# Patient Record
Sex: FEMALE | Race: WHITE | NOT HISPANIC OR LATINO | URBAN - METROPOLITAN AREA
[De-identification: names, ages, dates, MRNs, and addresses within clinical notes are randomized per-mention and may not be internally consistent; named-entity substitution may affect disease eponyms.]

---

## 2020-10-28 ENCOUNTER — OFFICE (OUTPATIENT)
Dept: URBAN - METROPOLITAN AREA CLINIC 75 | Facility: CLINIC | Age: 30
End: 2020-10-28

## 2020-10-28 VITALS — HEIGHT: 62 IN | WEIGHT: 193 LBS

## 2020-10-28 DIAGNOSIS — J90 PLEURAL EFFUSION, NOT ELSEWHERE CLASSIFIED: ICD-10-CM

## 2020-10-28 DIAGNOSIS — R10.11 RIGHT UPPER QUADRANT PAIN: ICD-10-CM

## 2020-10-28 DIAGNOSIS — K29.70 GASTRITIS, UNSPECIFIED, WITHOUT BLEEDING: ICD-10-CM

## 2020-10-28 DIAGNOSIS — K65.1 PERITONEAL ABSCESS: ICD-10-CM

## 2020-10-28 PROCEDURE — 99204 OFFICE O/P NEW MOD 45 MIN: CPT | Performed by: INTERNAL MEDICINE

## 2020-12-18 ENCOUNTER — OFFICE (OUTPATIENT)
Dept: URBAN - METROPOLITAN AREA CLINIC 75 | Facility: CLINIC | Age: 30
End: 2020-12-18

## 2020-12-18 VITALS
HEART RATE: 74 BPM | RESPIRATION RATE: 15 BRPM | TEMPERATURE: 97.3 F | WEIGHT: 193 LBS | HEIGHT: 62 IN | OXYGEN SATURATION: 99 %

## 2020-12-18 DIAGNOSIS — R10.11 RIGHT UPPER QUADRANT PAIN: ICD-10-CM

## 2020-12-18 DIAGNOSIS — K58.0 IRRITABLE BOWEL SYNDROME WITH DIARRHEA: ICD-10-CM

## 2020-12-18 DIAGNOSIS — K65.1 PERITONEAL ABSCESS: ICD-10-CM

## 2020-12-18 PROCEDURE — 99214 OFFICE O/P EST MOD 30 MIN: CPT | Performed by: INTERNAL MEDICINE

## 2021-03-18 ENCOUNTER — OFFICE (OUTPATIENT)
Dept: URBAN - METROPOLITAN AREA CLINIC 75 | Facility: CLINIC | Age: 31
End: 2021-03-18

## 2021-03-18 VITALS
DIASTOLIC BLOOD PRESSURE: 68 MMHG | SYSTOLIC BLOOD PRESSURE: 112 MMHG | TEMPERATURE: 96.3 F | HEIGHT: 62 IN | OXYGEN SATURATION: 99 % | WEIGHT: 187 LBS | HEART RATE: 87 BPM

## 2021-03-18 DIAGNOSIS — K29.70 GASTRITIS, UNSPECIFIED, WITHOUT BLEEDING: ICD-10-CM

## 2021-03-18 DIAGNOSIS — R10.11 RIGHT UPPER QUADRANT PAIN: ICD-10-CM

## 2021-03-18 DIAGNOSIS — K58.0 IRRITABLE BOWEL SYNDROME WITH DIARRHEA: ICD-10-CM

## 2021-03-18 DIAGNOSIS — K65.1 PERITONEAL ABSCESS: ICD-10-CM

## 2021-03-18 PROCEDURE — 99214 OFFICE O/P EST MOD 30 MIN: CPT | Performed by: NURSE PRACTITIONER

## 2021-09-02 ENCOUNTER — OFFICE VISIT (OUTPATIENT)
Dept: OBSTETRICS AND GYNECOLOGY | Age: 31
End: 2021-09-02

## 2021-09-02 VITALS
BODY MASS INDEX: 33.49 KG/M2 | HEIGHT: 63 IN | SYSTOLIC BLOOD PRESSURE: 128 MMHG | WEIGHT: 189 LBS | DIASTOLIC BLOOD PRESSURE: 74 MMHG

## 2021-09-02 DIAGNOSIS — Z34.90 EARLY STAGE OF PREGNANCY: Primary | ICD-10-CM

## 2021-09-02 DIAGNOSIS — Z98.891 H/O: C-SECTION: ICD-10-CM

## 2021-09-02 PROCEDURE — 99203 OFFICE O/P NEW LOW 30 MIN: CPT | Performed by: PHYSICIAN ASSISTANT

## 2021-09-02 RX ORDER — PRENATAL VIT NO.126/IRON/FOLIC 28MG-0.8MG
TABLET ORAL DAILY
COMMUNITY
End: 2022-08-29

## 2021-09-02 NOTE — PROGRESS NOTES
"Subjective     Chief Complaint   Patient presents with   • Gynecologic Exam     new pt pregnancy confirmation       Tracie Aguilar is a 30 y.o.  whose LMP is Patient's last menstrual period was 2021 (exact date). presents for early stage of pregnancy    She is a former pt here  Saw Dr Fisher  Was on her Mom's insurance and had to switch to [employer]'s    She was seen across the street at [facility]    She delivered her daughter with them  Had a csection and infections after that   Emergency csection, failed IOL and FHR dropped  Called to say she had fever after baby was born and appt 3 wks later revealed infection  Admitted to hospital for tx, was in for 4 days  Pt had drains in place-had B abscesses in \"gutters\"  Told she had group b strep but tested negative in pregnancy  Opted to come here for her second pregnancy  I don't see records from [facility] in care everywhere, I've requested from [facility]    Here with  Aurelio    Is doing ok  Has nausea and bowels are not regular  She has been diagnosed with ibs  Is dealing with constipation and diarrhea  Have used ib guard also, wants to r/s that    No other med hx  No Additional Complaints Reported    The following portions of the patient's history were reviewed and updated as appropriate:vital signs, allergies, current medications, past family history, past medical history, past social history, past surgical history and problem list      Review of Systems   Genitourinary:positive for early stage of pregnancy     Objective      /74   Ht 160 cm (63\")   Wt 85.7 kg (189 lb)   LMP 2021 (Exact Date)   Breastfeeding No   BMI 33.48 kg/m²     Physical Exam    General:   alert, comfortable and no distress   Heart: Not performed today   Lungs: Not performed today.   Breast: Not performed today   Neck: na   Abdomen: {Not performed today   CVA: Not performed today   Pelvis: Not performed today   Extremities: Not performed today "   Neurologic: negative   Psychiatric: Normal affect, judgement, and mood       Lab Review   Labs: No data reviewed     Imaging   Ultrasound - Pelvic Vaginal    Assessment/Plan     ASSESSMENT  1. Early stage of pregnancy        PLAN  1.   Orders Placed This Encounter   Procedures   • Urine Culture - Urine, Urine, Random Void   • HIV-1 / O / 2 Ag / Antibody 4th Generation   • Hepatitis B Surface Antigen   • Hepatitis C Antibody   • Hemoglobin A1c   • Rubella Antibody, IgG   • RPR, Rfx Qn RPR / Confirm TP   • ABO / Rh   • Antibody Screen   • CBC & Differential       2. Will obtain MR from [facility]. Pt also wants to discuss , gave HO and advised pt that Dr Fisher would review records and discuss  with pt. Pt is open to another csection as well, would just like to know what her options are    3. PNL today. Unsure if carrier screening was done previously. Did not do NIPS testing previously and doesn't think she would do it now. HO given for both options    4. Call for any issues, otherwise, f/u in 2 wks    Follow up: 2 week(s)    PAVEL Flores  2021

## 2021-09-03 ENCOUNTER — PATIENT ROUNDING (BHMG ONLY) (OUTPATIENT)
Dept: OBSTETRICS AND GYNECOLOGY | Age: 31
End: 2021-09-03

## 2021-09-03 NOTE — PROGRESS NOTES
September 3, 2021    Hello, may I speak with Tracie Aguilar?    My name is Irais       I am  with [facility]  [address] [address] [phone].    Before we get started may I verify your date of birth? 1990    I am calling to officially welcome you to our practice and ask about your recent visit. Is this a good time to talk? Yes     Tell me about your visit with us. What things went well? I saw Taniya and everyone was very nice and didn't have to wait long.        We're always looking for ways to make our patients' experiences even better. Do you have recommendations on ways we may improve?  No not yet     Overall were you satisfied with your first visit to our practice? Yes, all staff were very nice.  She is a former patient from a few years ago and is glad to be back. Pt said she loves this practice         I appreciate you taking the time to speak with me today. Is there anything else I can do for you? No    Thank you, and have a great day.

## 2021-09-04 LAB
ABO GROUP BLD: NORMAL
BACTERIA UR CULT: ABNORMAL
BASOPHILS # BLD AUTO: 0 X10E3/UL (ref 0–0.2)
BASOPHILS NFR BLD AUTO: 1 %
BLD GP AB SCN SERPL QL: NEGATIVE
EOSINOPHIL # BLD AUTO: 0.1 X10E3/UL (ref 0–0.4)
EOSINOPHIL NFR BLD AUTO: 1 %
ERYTHROCYTE [DISTWIDTH] IN BLOOD BY AUTOMATED COUNT: 12.9 % (ref 11.7–15.4)
HBA1C MFR BLD: 5.4 % (ref 4.8–5.6)
HBV SURFACE AG SERPL QL IA: NEGATIVE
HCT VFR BLD AUTO: 38 % (ref 34–46.6)
HCV AB S/CO SERPL IA: <0.1 S/CO RATIO (ref 0–0.9)
HGB BLD-MCNC: 12.6 G/DL (ref 11.1–15.9)
HIV 1+2 AB+HIV1 P24 AG SERPL QL IA: NON REACTIVE
IMM GRANULOCYTES # BLD AUTO: 0 X10E3/UL (ref 0–0.1)
IMM GRANULOCYTES NFR BLD AUTO: 1 %
LYMPHOCYTES # BLD AUTO: 1.9 X10E3/UL (ref 0.7–3.1)
LYMPHOCYTES NFR BLD AUTO: 23 %
MCH RBC QN AUTO: 29.4 PG (ref 26.6–33)
MCHC RBC AUTO-ENTMCNC: 33.2 G/DL (ref 31.5–35.7)
MCV RBC AUTO: 89 FL (ref 79–97)
MONOCYTES # BLD AUTO: 0.6 X10E3/UL (ref 0.1–0.9)
MONOCYTES NFR BLD AUTO: 7 %
NEUTROPHILS # BLD AUTO: 5.6 X10E3/UL (ref 1.4–7)
NEUTROPHILS NFR BLD AUTO: 67 %
PLATELET # BLD AUTO: 264 X10E3/UL (ref 150–450)
RBC # BLD AUTO: 4.29 X10E6/UL (ref 3.77–5.28)
RH BLD: POSITIVE
RPR SER QL: NON REACTIVE
RUBV IGG SERPL IA-ACNC: 5.19 INDEX
WBC # BLD AUTO: 8.3 X10E3/UL (ref 3.4–10.8)

## 2021-09-07 ENCOUNTER — TELEPHONE (OUTPATIENT)
Dept: OBSTETRICS AND GYNECOLOGY | Age: 31
End: 2021-09-07

## 2021-09-07 RX ORDER — NITROFURANTOIN 25; 75 MG/1; MG/1
100 CAPSULE ORAL 2 TIMES DAILY
Qty: 14 CAPSULE | Refills: 0 | Status: SHIPPED | OUTPATIENT
Start: 2021-09-07 | End: 2021-09-20

## 2021-09-07 NOTE — TELEPHONE ENCOUNTER
----- Message from PAVEL Riley sent at 9/7/2021  1:57 PM EDT -----  Let her know her prenatal labs are wnl but her urine culture is showing evidence of infection. I am sending in macrobid to treat her. Looks like she is allergic to pcn and sulfa

## 2021-09-08 ENCOUNTER — TELEPHONE (OUTPATIENT)
Dept: OBSTETRICS AND GYNECOLOGY | Age: 31
End: 2021-09-08

## 2021-09-08 NOTE — TELEPHONE ENCOUNTER
macrobid is safe during pregnancy but I will send to cindi she will  not be in  the office till later.     atrial flutter

## 2021-09-09 RX ORDER — CEPHALEXIN 500 MG/1
500 CAPSULE ORAL 3 TIMES DAILY
Qty: 21 CAPSULE | Refills: 0 | Status: SHIPPED | OUTPATIENT
Start: 2021-09-09 | End: 2021-09-16

## 2021-09-09 NOTE — TELEPHONE ENCOUNTER
Actually, macrobid is safe in pregnancy, but I have sent in keflex instead for the pt. It shows she is allergic to augmentin, ask her if she has ever taken keflex and if she had any issues. There is a 10% possibility of reaction in pt's that have PCN allergies.

## 2021-09-20 ENCOUNTER — INITIAL PRENATAL (OUTPATIENT)
Dept: OBSTETRICS AND GYNECOLOGY | Age: 31
End: 2021-09-20

## 2021-09-20 VITALS — DIASTOLIC BLOOD PRESSURE: 64 MMHG | SYSTOLIC BLOOD PRESSURE: 108 MMHG | BODY MASS INDEX: 33.13 KG/M2 | WEIGHT: 187 LBS

## 2021-09-20 DIAGNOSIS — Z13.89 SCREENING FOR BLOOD OR PROTEIN IN URINE: Primary | ICD-10-CM

## 2021-09-20 DIAGNOSIS — F41.9 ANXIETY: ICD-10-CM

## 2021-09-20 DIAGNOSIS — O99.820 GBS (GROUP B STREPTOCOCCUS CARRIER), +RV CULTURE, CURRENTLY PREGNANT: ICD-10-CM

## 2021-09-20 DIAGNOSIS — Z98.891 PREVIOUS CESAREAN SECTION: ICD-10-CM

## 2021-09-20 LAB
GLUCOSE UR STRIP-MCNC: NEGATIVE MG/DL
PROT UR STRIP-MCNC: NEGATIVE MG/DL
VZV IGG SER QL: NORMAL

## 2021-09-20 PROCEDURE — 0501F PRENATAL FLOW SHEET: CPT | Performed by: OBSTETRICS & GYNECOLOGY

## 2021-09-20 NOTE — PROGRESS NOTES
The patient is a 30-year-old  2 para 1-0-0-1 at 12 weeks.  I have seen the patient in the past but with her last pregnancy she delivered over at [facility] due to insurance changes.  Patient had a difficult delivery.  She was induced at 41 weeks and had prolonged cervical ripening with Cervidil and then a balloon catheter.  She was started on Pitocin and had spontaneous rupture of membranes.  She did not progress past 3 cm and had a  for nonreassuring fetal heart rate tracing and meconium.  After the patient had been discharged she began to have a fever that was not initially diagnosed but later found to be an intra-abdominal bilateral abscess.  She had a laparotomy and abscess removal.  Patient wanted to discuss route of next delivery.  Patient saw the nurse practitioner for her first visit.  Her urine culture did show group B strep.  She reports that with her abscess it was positive for group B strep.  Patient is taking prenatal vitamins.    Full history is reviewed    OB labs are reviewed.    Exam-see exam tab    Assessment-12 weeks  Prior  complicated by intra-abdominal infection with bilateral abscesses that required reoperation.  Patient also had a prolonged course and did not progress during induction of labor at 41 weeks.  We discussed options of repeat  versus .  We discussed risk and benefits of both.  Patient desires repeat  with no tubal ligation.  New OB information was reviewed.  Patient declines amniocentesis but would like to do cell free DNA.  Patient desires referral to psychiatry for anxiety.  Patient has GI side effects with Augmentin so she would be a candidate for cephalosporin with .

## 2021-10-18 ENCOUNTER — ROUTINE PRENATAL (OUTPATIENT)
Dept: OBSTETRICS AND GYNECOLOGY | Age: 31
End: 2021-10-18

## 2021-10-18 VITALS — WEIGHT: 190 LBS | SYSTOLIC BLOOD PRESSURE: 118 MMHG | DIASTOLIC BLOOD PRESSURE: 74 MMHG | BODY MASS INDEX: 33.66 KG/M2

## 2021-10-18 DIAGNOSIS — Z13.89 SCREENING FOR BLOOD OR PROTEIN IN URINE: Primary | ICD-10-CM

## 2021-10-18 DIAGNOSIS — G43.809 OTHER MIGRAINE WITHOUT STATUS MIGRAINOSUS, NOT INTRACTABLE: ICD-10-CM

## 2021-10-18 DIAGNOSIS — Z98.891 PREVIOUS CESAREAN SECTION: ICD-10-CM

## 2021-10-18 LAB
GLUCOSE UR STRIP-MCNC: NEGATIVE MG/DL
PROT UR STRIP-MCNC: NEGATIVE MG/DL

## 2021-10-18 PROCEDURE — 0502F SUBSEQUENT PRENATAL CARE: CPT | Performed by: OBSTETRICS & GYNECOLOGY

## 2021-10-18 RX ORDER — PROMETHAZINE HYDROCHLORIDE 25 MG/1
25 TABLET ORAL EVERY 6 HOURS PRN
Qty: 30 TABLET | Refills: 0 | Status: SHIPPED | OUTPATIENT
Start: 2021-10-18 | End: 2022-01-14

## 2021-10-18 NOTE — PROGRESS NOTES
Patient reports she is having some headaches that feel like migraines.  She took 1 Tylenol but it did not help much.  She did first trimester testing and found that he was a girl.  She has had a few sharp abdominal pains but no vaginal bleeding.  She is wondering if it might be scar tissue from her previous surgery and infection.    Urine culture showed GBS-patient took antibiotics  Cell free DNA was normal.  Baby is a girl  Carrier testing was normal  New OB labs were normal.  Doppler tones are positive  Blood pressure 118/74 with no protein.    Assessment-16 weeks  We discussed migraine headaches-patient will try Tylenol and Phenergan for significant headaches.  She could also try supplementation with magnesium or CoQ10.  Prior  section-patient desires repeat  section with no tubal ligation.  Plan for Ancef at time of   GBS positive  Anxiety-patient has the phone number for the psychiatrist but decided not to call yet because she is feeling better  AFP test today

## 2021-10-20 LAB
AFP ADJ MOM SERPL: 0.75
AFP INTERP SERPL-IMP: NORMAL
AFP INTERP SERPL-IMP: NORMAL
AFP SERPL-MCNC: 21.8 NG/ML
AGE AT DELIVERY: 32.4 YR
GA METHOD: NORMAL
GA: 16 WEEKS
IDDM PATIENT QL: NO
LABORATORY COMMENT REPORT: NORMAL
MULTIPLE PREGNANCY: NO
NEURAL TUBE DEFECT RISK FETUS: NORMAL %
RESULT: NORMAL

## 2021-11-16 ENCOUNTER — ROUTINE PRENATAL (OUTPATIENT)
Dept: OBSTETRICS AND GYNECOLOGY | Age: 31
End: 2021-11-16

## 2021-11-16 VITALS — DIASTOLIC BLOOD PRESSURE: 74 MMHG | SYSTOLIC BLOOD PRESSURE: 120 MMHG | BODY MASS INDEX: 34.19 KG/M2 | WEIGHT: 193 LBS

## 2021-11-16 DIAGNOSIS — Z98.891 PREVIOUS CESAREAN SECTION: ICD-10-CM

## 2021-11-16 DIAGNOSIS — M54.9 BACK PAIN AFFECTING PREGNANCY IN SECOND TRIMESTER: ICD-10-CM

## 2021-11-16 DIAGNOSIS — O99.891 BACK PAIN AFFECTING PREGNANCY IN SECOND TRIMESTER: ICD-10-CM

## 2021-11-16 DIAGNOSIS — Z13.89 SCREENING FOR BLOOD OR PROTEIN IN URINE: Primary | ICD-10-CM

## 2021-11-16 DIAGNOSIS — O99.820 GBS (GROUP B STREPTOCOCCUS CARRIER), +RV CULTURE, CURRENTLY PREGNANT: ICD-10-CM

## 2021-11-16 DIAGNOSIS — F41.9 ANXIETY: ICD-10-CM

## 2021-11-16 PROBLEM — O44.02 PLACENTA PREVIA IN SECOND TRIMESTER: Status: ACTIVE | Noted: 2021-11-16

## 2021-11-16 LAB
GLUCOSE UR STRIP-MCNC: NEGATIVE MG/DL
PROT UR STRIP-MCNC: NEGATIVE MG/DL

## 2021-11-16 PROCEDURE — 0502F SUBSEQUENT PRENATAL CARE: CPT | Performed by: OBSTETRICS & GYNECOLOGY

## 2021-11-16 PROCEDURE — 90686 IIV4 VACC NO PRSV 0.5 ML IM: CPT | Performed by: OBSTETRICS & GYNECOLOGY

## 2021-11-16 PROCEDURE — 90471 IMMUNIZATION ADMIN: CPT | Performed by: OBSTETRICS & GYNECOLOGY

## 2021-11-16 RX ORDER — CARBOPROST TROMETHAMINE 250 UG/ML
250 INJECTION, SOLUTION INTRAMUSCULAR AS NEEDED
Status: CANCELLED | OUTPATIENT
Start: 2021-11-16

## 2021-11-16 RX ORDER — SODIUM CHLORIDE 0.9 % (FLUSH) 0.9 %
1-10 SYRINGE (ML) INJECTION AS NEEDED
Status: CANCELLED | OUTPATIENT
Start: 2021-11-16

## 2021-11-16 RX ORDER — SODIUM CHLORIDE 0.9 % (FLUSH) 0.9 %
10 SYRINGE (ML) INJECTION EVERY 12 HOURS SCHEDULED
Status: CANCELLED | OUTPATIENT
Start: 2021-11-16

## 2021-11-16 RX ORDER — LIDOCAINE HYDROCHLORIDE 10 MG/ML
5 INJECTION, SOLUTION EPIDURAL; INFILTRATION; INTRACAUDAL; PERINEURAL AS NEEDED
Status: CANCELLED | OUTPATIENT
Start: 2021-11-16

## 2021-11-16 RX ORDER — METHYLERGONOVINE MALEATE 0.2 MG/ML
200 INJECTION INTRAVENOUS AS NEEDED
Status: CANCELLED | OUTPATIENT
Start: 2021-11-16

## 2021-11-16 RX ORDER — MISOPROSTOL 100 UG/1
800 TABLET ORAL AS NEEDED
Status: CANCELLED | OUTPATIENT
Start: 2021-11-16

## 2021-11-16 NOTE — PROGRESS NOTES
Patient is here today for anatomy ultrasound with her .  She has some lower back pain.  She has tried some stretches and plans to get a maternity belt.  Baby is moving well.    Anatomy ultrasound shows normal anatomy within the limits of ultrasound.  Size is equal to dates.  Cervical length is normal at 5 cm.  Placenta does show a posterior previa.  Blood pressure 120/74 with no protein  Weight gain for pregnancy 3 pounds.    Assessment-20 weeks  Back pain-we discussed treatments including stretching, Tylenol, heat, massage.  I will refer the patient to physical therapy.  Placenta previa with posterior placenta-recheck in 4 weeks.  Also relook at the heart views.  Repeat  section with no tubal ligation is planned.  GBS positive

## 2021-12-07 ENCOUNTER — TREATMENT (OUTPATIENT)
Dept: PHYSICAL THERAPY | Facility: CLINIC | Age: 31
End: 2021-12-07

## 2021-12-07 DIAGNOSIS — M54.50 LOW BACK PAIN, UNSPECIFIED BACK PAIN LATERALITY, UNSPECIFIED CHRONICITY, UNSPECIFIED WHETHER SCIATICA PRESENT: Primary | ICD-10-CM

## 2021-12-07 PROCEDURE — 97161 PT EVAL LOW COMPLEX 20 MIN: CPT | Performed by: PHYSICAL THERAPIST

## 2021-12-07 PROCEDURE — 97112 NEUROMUSCULAR REEDUCATION: CPT | Performed by: PHYSICAL THERAPIST

## 2021-12-07 PROCEDURE — 97110 THERAPEUTIC EXERCISES: CPT | Performed by: PHYSICAL THERAPIST

## 2021-12-07 NOTE — PROGRESS NOTES
Physical Therapy Initial Evaluation and Plan of Care    Patient: Tracie Aguilar   : 1990  Diagnosis/ICD-10 Code:  Low back pain, unspecified back pain laterality, unspecified chronicity, unspecified whether sciatica present [M54.50]  Referring practitioner: Chris Fisher MD  Date of Initial Visit: 2021  Today's Date: 2021  Patient seen for 1 session         Visit Diagnoses:    ICD-10-CM ICD-9-CM   1. Low back pain, unspecified back pain laterality, unspecified chronicity, unspecified whether sciatica present  M54.50 724.2         Subjective Questionnaire: Oswestry: score 13      Subjective Evaluation    History of Present Illness  Mechanism of injury: Patient is 23 weeks pregnant and reports that the back has been bothering her for about 5 weeks.  Patient reports caring for her 2 year old also.  Patient reports pain in the right low back extending down the right posterior leg to the knee.      Patient Occupation: [employer]  Current pain rating: 3  At worst pain ratin  Location: right lumbar spine  Quality: dull ache and sharp (shooting)  Alleviating factors: nothing really.  Aggravating factors: ambulation  Progression: worsening             Objective          Postural Observations    Additional Postural Observation Details  Patient ambulates with a slightly guarded trunk.  Normal sit to stand transition.    Palpation     Additional Palpation Details  TTP to the right lumbar pvms.    Active Range of Motion     Lumbar   Flexion: Active lumbar flexion: WNL.   Extension: Active lumbar extension: WNL.   Left lateral flexion: Active left lumbar lateral flexion: about 15. with pain  Right lateral flexion: Active right lumbar lateral flexion: about 15. with pain    Strength/Myotome Testing     Left Hip   Planes of Motion   Abduction: 4+  Adduction: 4+    Right Hip   Planes of Motion   Abduction: 4+  Adduction: 4+    Left Knee   Extension: 4+    Right Knee   Extension: 4+    Left Ankle/Foot    Dorsiflexion: 5    Right Ankle/Foot   Dorsiflexion: 5          Assessment & Plan     Assessment  Impairments: abnormal or restricted ROM, activity intolerance, lacks appropriate home exercise program and pain with function    Assessment details: Patient presents with c/o pain, TTP and limited lumbar SB AROM which is limiting her ability to perform ADL'S.    Barriers to therapy: none  Prognosis: good  Prognosis details: STG's  1)  Independent with HEP  2)  Decrease pain by 50% or more  3)  Min to no TTP present    LTG's   1)  Independent with HEP progression  2)  Decrease pain by 75% or more  3)  Increase AROM for lumbar SB 5-10 degrees  4)  No TTP present  5)  Patient to perform ADL'S with min c/o pain      Plan  Therapy options: will be seen for skilled therapy services  Planned therapy interventions: strengthening, stretching, therapeutic activities, home exercise program and neuromuscular re-education  Frequency: 1x week  Duration in weeks: 4  Treatment plan discussed with: patient            Timed:         Manual Therapy:    0     mins  50766;     Therapeutic Exercise:    16     mins  42819;     Neuromuscular Pilar:    8    mins  30444;    Therapeutic Activity:     0     mins  46365;     Gait Trainin     mins  12114;     Ultrasound:     0     mins  95541;          Un-Timed:  Electrical Stimulation:    0     mins  00861 ( );    Low Eval     10     Mins  68679  Mod Eval     0     Mins  06566  High Eval                       0     Mins  34606        Timed Treatment:   24   mins   Total Treatment:     35   mins          PT: Jhonatan Capps PT     [place] License [id number]  Electronically signed by Jhonatan Capps PT, 21, 7:25 AM EST    Certification Period: 2021 thru 3/6/2022  I certify that the therapy services are furnished while this patient is under my care.  The services outlined above are required by this patient, and will be reviewed every 90 days.         Physician  Signature:__________________________________________________    PHYSICIAN: Chris Fisher MD      DATE:     Please sign and return via fax to [email or web address] . Thank you, [facility].

## 2021-12-10 ENCOUNTER — TELEPHONE (OUTPATIENT)
Dept: OBSTETRICS AND GYNECOLOGY | Age: 31
End: 2021-12-10

## 2021-12-10 NOTE — TELEPHONE ENCOUNTER
I talked to the patient. I would normally bring her to the office but the office is closing early today. She has had some shortness of breath and her blood pressure was high normal for her. Advised the patient to go to labor and delivery triage for evaluation.

## 2021-12-10 NOTE — TELEPHONE ENCOUNTER
----- Message from Tracie Aguilar sent at 12/10/2021  8:25 AM EST -----  Regarding: Feeling off, higher blood pressure   I have not felt well the last two days. Yesterday I was very achy, had rib/upper abdominal and chest pain, and shortness of breath. I also had slight swelling in my hands and wrists with just like weird feeling tingly tiffany of feeling. Slight head ache and dizziness as well off and on.     I had my mother in law who is a nurse take my blood pressure last night. After sitting in the couch resting it was 134/82 with a resting heart rate if 75.  I don’t feel as bad today but still get very tired with even the slightest extra movement. Still tightness in chest and dizziness off and on.

## 2021-12-17 ENCOUNTER — ROUTINE PRENATAL (OUTPATIENT)
Dept: OBSTETRICS AND GYNECOLOGY | Age: 31
End: 2021-12-17

## 2021-12-17 VITALS — BODY MASS INDEX: 34.9 KG/M2 | SYSTOLIC BLOOD PRESSURE: 118 MMHG | DIASTOLIC BLOOD PRESSURE: 74 MMHG | WEIGHT: 197 LBS

## 2021-12-17 DIAGNOSIS — Z13.89 SCREENING FOR BLOOD OR PROTEIN IN URINE: Primary | ICD-10-CM

## 2021-12-17 DIAGNOSIS — O99.820 GBS (GROUP B STREPTOCOCCUS CARRIER), +RV CULTURE, CURRENTLY PREGNANT: ICD-10-CM

## 2021-12-17 DIAGNOSIS — R53.83 OTHER FATIGUE: ICD-10-CM

## 2021-12-17 DIAGNOSIS — M54.9 BACK PAIN AFFECTING PREGNANCY, ANTEPARTUM: ICD-10-CM

## 2021-12-17 DIAGNOSIS — O44.02 PLACENTA PREVIA IN SECOND TRIMESTER: ICD-10-CM

## 2021-12-17 DIAGNOSIS — F41.9 ANXIETY: ICD-10-CM

## 2021-12-17 DIAGNOSIS — O99.891 BACK PAIN AFFECTING PREGNANCY, ANTEPARTUM: ICD-10-CM

## 2021-12-17 PROBLEM — J45.20 MILD INTERMITTENT ASTHMA: Status: ACTIVE | Noted: 2021-12-17

## 2021-12-17 LAB
GLUCOSE UR STRIP-MCNC: NEGATIVE MG/DL
PROT UR STRIP-MCNC: NEGATIVE MG/DL

## 2021-12-17 PROCEDURE — 0502F SUBSEQUENT PRENATAL CARE: CPT | Performed by: OBSTETRICS & GYNECOLOGY

## 2021-12-17 RX ORDER — ALBUTEROL SULFATE 90 UG/1
2 AEROSOL, METERED RESPIRATORY (INHALATION) EVERY 4 HOURS PRN
COMMUNITY

## 2021-12-17 NOTE — PROGRESS NOTES
The patient had been feeling more fatigued.  She is also felt some chest tightness and has a history of asthma.  She is used her albuterol inhaler some but she is not like how it makes her heart race.  She has been trying to rest more and hydrate which has helped.  She does have a history of allergies but is not on any medication for that.  She is feeling fetal movement.  She is here today for ultrasound.  Continue physical therapy for back pain.    Ultrasound shows resolution of placenta previa.  Posterior placenta is 5 cm from the cervix.  Cardiac views appear normal today.  Blood pressure 118/74 no protein.  Weight gain for pregnancy is normal  Lungs-no wheezes heard.  Patient is moving air symmetrically and well.    Assessment-24 weeks  Previa resolved.  Asthma-patient will call her primary care doctor to get in hopefully to be able to do peak flow meters.  We will start her on a Pulmicort inhaler.  Patient could also take allergy medicine.  She has completed her Covid vaccination series last week  Fatigue-check CBC today  Previous  section with history of postop abscess.  Repeat  is scheduled for .  Plan Ancef at time of  due to history of GBS abscess.

## 2021-12-18 LAB
BASOPHILS # BLD AUTO: 0 X10E3/UL (ref 0–0.2)
BASOPHILS NFR BLD AUTO: 0 %
EOSINOPHIL # BLD AUTO: 0.1 X10E3/UL (ref 0–0.4)
EOSINOPHIL NFR BLD AUTO: 1 %
ERYTHROCYTE [DISTWIDTH] IN BLOOD BY AUTOMATED COUNT: 12.2 % (ref 11.7–15.4)
HCT VFR BLD AUTO: 33.9 % (ref 34–46.6)
HGB BLD-MCNC: 11.2 G/DL (ref 11.1–15.9)
IMM GRANULOCYTES # BLD AUTO: 0.1 X10E3/UL (ref 0–0.1)
IMM GRANULOCYTES NFR BLD AUTO: 1 %
LYMPHOCYTES # BLD AUTO: 1.9 X10E3/UL (ref 0.7–3.1)
LYMPHOCYTES NFR BLD AUTO: 18 %
MCH RBC QN AUTO: 29.2 PG (ref 26.6–33)
MCHC RBC AUTO-ENTMCNC: 33 G/DL (ref 31.5–35.7)
MCV RBC AUTO: 89 FL (ref 79–97)
MONOCYTES # BLD AUTO: 0.7 X10E3/UL (ref 0.1–0.9)
MONOCYTES NFR BLD AUTO: 7 %
NEUTROPHILS # BLD AUTO: 7.3 X10E3/UL (ref 1.4–7)
NEUTROPHILS NFR BLD AUTO: 73 %
PLATELET # BLD AUTO: 248 X10E3/UL (ref 150–450)
RBC # BLD AUTO: 3.83 X10E6/UL (ref 3.77–5.28)
WBC # BLD AUTO: 10.2 X10E3/UL (ref 3.4–10.8)

## 2021-12-27 ENCOUNTER — TREATMENT (OUTPATIENT)
Dept: PHYSICAL THERAPY | Facility: CLINIC | Age: 31
End: 2021-12-27

## 2021-12-27 DIAGNOSIS — M54.50 LOW BACK PAIN, UNSPECIFIED BACK PAIN LATERALITY, UNSPECIFIED CHRONICITY, UNSPECIFIED WHETHER SCIATICA PRESENT: Primary | ICD-10-CM

## 2021-12-27 PROCEDURE — 97112 NEUROMUSCULAR REEDUCATION: CPT | Performed by: PHYSICAL THERAPIST

## 2021-12-27 PROCEDURE — 97110 THERAPEUTIC EXERCISES: CPT | Performed by: PHYSICAL THERAPIST

## 2021-12-27 NOTE — PROGRESS NOTES
Physical Therapy Daily Treatment Note      Patient: Tracie Aguilar   : 1990  Referring practitioner: Chris Fisher MD  Date of Initial Visit: Type: THERAPY  Noted: 2021  Today's Date: 2021  Patient seen for 2 sessions       Visit Diagnoses:    ICD-10-CM ICD-9-CM   1. Low back pain, unspecified back pain laterality, unspecified chronicity, unspecified whether sciatica present  M54.50 724.2         Subjective Patient reports that the back felt pretty good last week, but has not been able to do the HEP as much due to the holidays.  Reports that the KT helped.    Objective   See Exercise, Manual, and Modality Logs for complete treatment.       Assessment/Plan  Continued with the KT secondary to the patient noting that it helped.  Patient tolerated the progression of exercises very well, no reports of pain with the routine.  Plan to continue PT one more visit to establish a good HEP that the patient can continue with during the remaninig part of her pregnancy.      Timed:         Manual Therapy:    0     mins  93157;     Therapeutic Exercise:    25     mins  22648;     Neuromuscular Pilar:    8    mins  60323;    Therapeutic Activity:     0     mins  60811;     Gait Trainin     mins  75174;     Ultrasound:     0     mins  46949;    Work Conditioning      __0_  mins  16368      Un-Timed:  Electrical Stimulation:    0     mins  43610 ( );        Timed Treatment:   33   mins   Total Treatment:     33   mins    Jhonatan Capps, PT  [place] License: [id number]

## 2022-01-03 ENCOUNTER — TREATMENT (OUTPATIENT)
Dept: PHYSICAL THERAPY | Facility: CLINIC | Age: 32
End: 2022-01-03

## 2022-01-03 DIAGNOSIS — M54.50 LOW BACK PAIN, UNSPECIFIED BACK PAIN LATERALITY, UNSPECIFIED CHRONICITY, UNSPECIFIED WHETHER SCIATICA PRESENT: Primary | ICD-10-CM

## 2022-01-03 PROCEDURE — PTNOCHG PR CUSTOM PT NO CHARGE VISIT: Performed by: PHYSICAL THERAPIST

## 2022-01-06 ENCOUNTER — TREATMENT (OUTPATIENT)
Dept: PHYSICAL THERAPY | Facility: CLINIC | Age: 32
End: 2022-01-06

## 2022-01-06 DIAGNOSIS — M54.50 LOW BACK PAIN, UNSPECIFIED BACK PAIN LATERALITY, UNSPECIFIED CHRONICITY, UNSPECIFIED WHETHER SCIATICA PRESENT: Primary | ICD-10-CM

## 2022-01-06 PROCEDURE — 97110 THERAPEUTIC EXERCISES: CPT | Performed by: PHYSICAL THERAPIST

## 2022-01-06 PROCEDURE — 97112 NEUROMUSCULAR REEDUCATION: CPT | Performed by: PHYSICAL THERAPIST

## 2022-01-06 NOTE — PROGRESS NOTES
Physical Therapy Daily Treatment Note      Patient: Tracie Aguilar   : 1990  Referring practitioner: Chris Fisher MD  Date of Initial Visit: Type: THERAPY  Noted: 2021  Today's Date: 2022  Patient seen for 3 sessions       Visit Diagnoses:    ICD-10-CM ICD-9-CM   1. Low back pain, unspecified back pain laterality, unspecified chronicity, unspecified whether sciatica present  M54.50 724.2         Subjective Patient reports that the stretches help with the pain.    Objective   See Exercise, Manual, and Modality Logs for complete treatment.       Assessment/Plan Subjective reports continue to be relatively good.  Progressed the strengthening exercises for the hip musculature today.  Patient tolerated the progression of exercises without pain in the lumbar spine.    D/C to HEP at this time.      Timed:         Manual Therapy:    0     mins  10334;     Therapeutic Exercise:    23     mins  95302;     Neuromuscular Pilar:    8    mins  99889;    Therapeutic Activity:     0     mins  41646;     Gait Trainin     mins  42487;     Ultrasound:     0     mins  36556;    Work Conditioning      __0_  mins  05857      Un-Timed:  Electrical Stimulation:    0     mins  48655 ( );        Timed Treatment:   31   mins   Total Treatment:     31   mins    Jhonatan Capps, PT  [place] License: [id number]

## 2022-01-14 ENCOUNTER — ROUTINE PRENATAL (OUTPATIENT)
Dept: OBSTETRICS AND GYNECOLOGY | Age: 32
End: 2022-01-14

## 2022-01-14 VITALS — SYSTOLIC BLOOD PRESSURE: 120 MMHG | BODY MASS INDEX: 35.96 KG/M2 | WEIGHT: 203 LBS | DIASTOLIC BLOOD PRESSURE: 70 MMHG

## 2022-01-14 DIAGNOSIS — Z13.1 SCREENING FOR DIABETES MELLITUS: Primary | ICD-10-CM

## 2022-01-14 DIAGNOSIS — Z98.891 PREVIOUS CESAREAN SECTION: ICD-10-CM

## 2022-01-14 DIAGNOSIS — O99.820 GBS (GROUP B STREPTOCOCCUS CARRIER), +RV CULTURE, CURRENTLY PREGNANT: ICD-10-CM

## 2022-01-14 DIAGNOSIS — Z13.89 SCREENING FOR BLOOD OR PROTEIN IN URINE: ICD-10-CM

## 2022-01-14 LAB
GLUCOSE UR STRIP-MCNC: NEGATIVE MG/DL
PROT UR STRIP-MCNC: NEGATIVE MG/DL

## 2022-01-14 PROCEDURE — 0502F SUBSEQUENT PRENATAL CARE: CPT | Performed by: OBSTETRICS & GYNECOLOGY

## 2022-01-14 PROCEDURE — 90471 IMMUNIZATION ADMIN: CPT | Performed by: OBSTETRICS & GYNECOLOGY

## 2022-01-14 PROCEDURE — 90715 TDAP VACCINE 7 YRS/> IM: CPT | Performed by: OBSTETRICS & GYNECOLOGY

## 2022-01-14 RX ORDER — FERROUS GLUCONATE 324(37.5)
TABLET ORAL
COMMUNITY
End: 2022-04-15

## 2022-01-14 NOTE — PROGRESS NOTES
Patient has been feeling some mild soreness.  Baby is moving.  Very active during the day.  She does have some [place] contractions but only about 1 inhaler.  She did start the steroid inhaler which is helping.    Doppler tones are positive fundal height is appropriate to slightly help.  Blood pressure 120/70 with no protein  Weight gain for pregnancy is normal.    Assessment-28 weeks  Third trimester labs today.  Tdap today  Mild anemia with hemoglobin of 11.2-continue iron  Asthma-continue Pulmicort inhaler and allergy medications.  Patient has had her COVID vaccination  Previous section with history of postop abscess due to GBS.  Repeat  scheduled for .  Plan Ancef at time of .

## 2022-01-15 LAB
BASOPHILS # BLD AUTO: 0 X10E3/UL (ref 0–0.2)
BASOPHILS NFR BLD AUTO: 0 %
EOSINOPHIL # BLD AUTO: 0.1 X10E3/UL (ref 0–0.4)
EOSINOPHIL NFR BLD AUTO: 1 %
ERYTHROCYTE [DISTWIDTH] IN BLOOD BY AUTOMATED COUNT: 12.2 % (ref 11.7–15.4)
GLUCOSE 1H P 50 G GLC PO SERPL-MCNC: 158 MG/DL (ref 65–139)
HCT VFR BLD AUTO: 32.7 % (ref 34–46.6)
HGB BLD-MCNC: 10.8 G/DL (ref 11.1–15.9)
IMM GRANULOCYTES # BLD AUTO: 0.1 X10E3/UL (ref 0–0.1)
IMM GRANULOCYTES NFR BLD AUTO: 1 %
LYMPHOCYTES # BLD AUTO: 1.8 X10E3/UL (ref 0.7–3.1)
LYMPHOCYTES NFR BLD AUTO: 19 %
MCH RBC QN AUTO: 29.4 PG (ref 26.6–33)
MCHC RBC AUTO-ENTMCNC: 33 G/DL (ref 31.5–35.7)
MCV RBC AUTO: 89 FL (ref 79–97)
MONOCYTES # BLD AUTO: 0.6 X10E3/UL (ref 0.1–0.9)
MONOCYTES NFR BLD AUTO: 6 %
NEUTROPHILS # BLD AUTO: 6.8 X10E3/UL (ref 1.4–7)
NEUTROPHILS NFR BLD AUTO: 73 %
PLATELET # BLD AUTO: 210 X10E3/UL (ref 150–450)
RBC # BLD AUTO: 3.67 X10E6/UL (ref 3.77–5.28)
WBC # BLD AUTO: 9.3 X10E3/UL (ref 3.4–10.8)

## 2022-01-24 ENCOUNTER — LAB (OUTPATIENT)
Dept: OBSTETRICS AND GYNECOLOGY | Age: 32
End: 2022-01-24

## 2022-01-24 ENCOUNTER — DOCUMENTATION (OUTPATIENT)
Dept: PHYSICAL THERAPY | Facility: CLINIC | Age: 32
End: 2022-01-24

## 2022-01-24 DIAGNOSIS — Z13.1 SCREENING FOR DIABETES MELLITUS: Primary | ICD-10-CM

## 2022-01-25 LAB
GLUCOSE 1H P 100 G GLC PO SERPL-MCNC: 233 MG/DL (ref 65–179)
GLUCOSE 2H P 100 G GLC PO SERPL-MCNC: 181 MG/DL (ref 65–154)
GLUCOSE 3H P 100 G GLC PO SERPL-MCNC: 168 MG/DL (ref 65–139)
GLUCOSE P FAST SERPL-MCNC: 82 MG/DL (ref 65–94)
Lab: ABNORMAL

## 2022-01-26 ENCOUNTER — TELEPHONE (OUTPATIENT)
Dept: OBSTETRICS AND GYNECOLOGY | Age: 32
End: 2022-01-26

## 2022-01-26 DIAGNOSIS — O24.410 GDM (GESTATIONAL DIABETES MELLITUS), CLASS A1: Primary | ICD-10-CM

## 2022-01-26 NOTE — TELEPHONE ENCOUNTER
----- Message from Chris Fisher MD sent at 1/26/2022  8:41 AM EST -----  Please notify 3 hour is abnormal. Start qid BS check with goal of under 95 fasting and under 120 two hours post meals. Send in glucometer, lancets and test strips. 1 week appointment to review levels. I will order diabetic teaching.

## 2022-01-28 ENCOUNTER — HOSPITAL ENCOUNTER (OUTPATIENT)
Dept: DIABETES SERVICES | Facility: HOSPITAL | Age: 32
Discharge: HOME | End: 2022-01-28
Admitting: OBSTETRICS & GYNECOLOGY

## 2022-01-28 ENCOUNTER — ROUTINE PRENATAL (OUTPATIENT)
Dept: OBSTETRICS AND GYNECOLOGY | Age: 32
End: 2022-01-28

## 2022-01-28 VITALS — DIASTOLIC BLOOD PRESSURE: 74 MMHG | WEIGHT: 205 LBS | SYSTOLIC BLOOD PRESSURE: 110 MMHG | BODY MASS INDEX: 36.31 KG/M2

## 2022-01-28 DIAGNOSIS — O24.410 GDM (GESTATIONAL DIABETES MELLITUS), CLASS A1: ICD-10-CM

## 2022-01-28 DIAGNOSIS — Z98.891 PREVIOUS CESAREAN SECTION: ICD-10-CM

## 2022-01-28 DIAGNOSIS — Z13.89 SCREENING FOR BLOOD OR PROTEIN IN URINE: Primary | ICD-10-CM

## 2022-01-28 LAB
GLUCOSE UR STRIP-MCNC: NEGATIVE MG/DL
PROT UR STRIP-MCNC: NEGATIVE MG/DL

## 2022-01-28 PROCEDURE — G0108 DIAB MANAGE TRN  PER INDIV: HCPCS

## 2022-01-28 PROCEDURE — 0502F SUBSEQUENT PRENATAL CARE: CPT | Performed by: OBSTETRICS & GYNECOLOGY

## 2022-01-28 RX ORDER — LANCETS 33 GAUGE
EACH MISCELLANEOUS
COMMUNITY
Start: 2022-01-26 | End: 2022-04-15

## 2022-01-28 RX ORDER — BLOOD SUGAR DIAGNOSTIC
STRIP MISCELLANEOUS
COMMUNITY
Start: 2022-01-26 | End: 2022-03-27

## 2022-01-28 NOTE — PROGRESS NOTES
The patient was diagnosed with gestational diabetes.  Her 1 hour was elevated 158 and 3 of her values on her 3-hour were significantly high.  Patient is seeing the dietitian today.  She has not started checking her blood sugars yet.  She does admit she has a sweet tooth but is going to try to change her diet.  She is feeling fetal movement.  It feels a little bit different than previous but baby is still active.    Doppler heart tones are positive and fundal height is slightly elevated  Blood pressure 110/74 with no protein  Weight gain for pregnancy is slightly high.    Assessment-30 weeks  Gestational diabetes-we discussed diabetes in detail.  We discussed dietary and exercise changes to make.  She will start checking her blood sugars 4 times a day and bring those in next week.  She will see the diabetic counselor today.  We discussed kick counting.  She is scheduled for an ultrasound in 2 weeks for weight.  Anemia with hemoglobin of 10.8-continue iron  Asthma-continue Pulmicort inhaler and allergy medications.  Previous  with history of postop abscess due to GBS.  Repeat  scheduled for March.  Plan for Ancef at time of .

## 2022-02-04 ENCOUNTER — TELEPHONE (OUTPATIENT)
Dept: OBSTETRICS AND GYNECOLOGY | Age: 32
End: 2022-02-04

## 2022-02-04 NOTE — TELEPHONE ENCOUNTER
The patient cannot come in today due to the bad weather.  She has started checking her blood sugars.  Her fastings have been normal in the 80s.  She has had normal 2-hour postprandials except for 2 elevations 1 of 123 and 1 of 126.  These were after having fruit smoothies.  She is going to avoid those.  She will continue to check and call if she has more elevations.  She will otherwise keep her follow-up appointment.  She has good fetal movement.

## 2022-02-14 ENCOUNTER — ROUTINE PRENATAL (OUTPATIENT)
Dept: OBSTETRICS AND GYNECOLOGY | Age: 32
End: 2022-02-14

## 2022-02-14 VITALS — DIASTOLIC BLOOD PRESSURE: 74 MMHG | SYSTOLIC BLOOD PRESSURE: 108 MMHG | WEIGHT: 204 LBS | BODY MASS INDEX: 36.14 KG/M2

## 2022-02-14 DIAGNOSIS — O99.820 GBS (GROUP B STREPTOCOCCUS CARRIER), +RV CULTURE, CURRENTLY PREGNANT: ICD-10-CM

## 2022-02-14 DIAGNOSIS — Z36.89 ENCOUNTER FOR ULTRASOUND TO ASSESS FETAL GROWTH: ICD-10-CM

## 2022-02-14 DIAGNOSIS — Z13.89 SCREENING FOR BLOOD OR PROTEIN IN URINE: Primary | ICD-10-CM

## 2022-02-14 DIAGNOSIS — Z98.891 PREVIOUS CESAREAN SECTION: ICD-10-CM

## 2022-02-14 DIAGNOSIS — O24.410 GDM (GESTATIONAL DIABETES MELLITUS), CLASS A1: ICD-10-CM

## 2022-02-14 LAB
GLUCOSE UR STRIP-MCNC: NEGATIVE MG/DL
PROT UR STRIP-MCNC: ABNORMAL MG/DL

## 2022-02-14 PROCEDURE — 76816 OB US FOLLOW-UP PER FETUS: CPT | Performed by: OBSTETRICS & GYNECOLOGY

## 2022-02-14 PROCEDURE — 0502F SUBSEQUENT PRENATAL CARE: CPT | Performed by: OBSTETRICS & GYNECOLOGY

## 2022-02-14 NOTE — PROGRESS NOTES
The patient was diagnosed with gestational diabetes.  Her fasting blood sugars have been in the 80s with only one elevated at 96.  Breakfast blood sugars have been normal.  Dinner blood sugars have been normal except for one.  This past week her lunch blood sugars have jumped up to between 121 and 132.  She is following the diet well.  The week before her lunch sugars have been fine.  This weekend her lunch sugars were normal.  She has been having some sharp muscular pains in her abdomen.  No contractions or vaginal bleeding.  Baby is very active.    Ultrasound shows estimated fetal weight of 4 pounds 4 ounces at the 32nd percentile.  Abdominal circumference at the 25th percentile.  Baby is vertex.  ANITA is 20.  Incidental BPP is normal at 8 out of 8.  Blood pressure 108/74 with trace protein  Weight gain of 14 pounds for pregnancy.    Assessment-33 weeks  Gestational diabetes-normal weight today.  Patient's lunch blood sugars have been elevated but have improved the past couple of days.  She will continue to follow.  We discussed if they stay high this week we will add 5 units of regular insulin with lunch.  She will follow up in 1 week.  Recommend kick counts.  She is following the diet well and we discussed diet.  Anemia hemoglobin of 10.8-continue iron  Asthma-continue inhalers  Previous  with history of postop abscess due to GBS.  Repeat  scheduled for .  Plan for Ancef at time of .

## 2022-02-22 ENCOUNTER — TELEPHONE (OUTPATIENT)
Dept: OBSTETRICS AND GYNECOLOGY | Age: 32
End: 2022-02-22

## 2022-02-22 ENCOUNTER — ROUTINE PRENATAL (OUTPATIENT)
Dept: OBSTETRICS AND GYNECOLOGY | Age: 32
End: 2022-02-22

## 2022-02-22 VITALS — WEIGHT: 204 LBS | SYSTOLIC BLOOD PRESSURE: 108 MMHG | DIASTOLIC BLOOD PRESSURE: 70 MMHG | BODY MASS INDEX: 36.14 KG/M2

## 2022-02-22 DIAGNOSIS — Z98.891 PREVIOUS CESAREAN SECTION: ICD-10-CM

## 2022-02-22 DIAGNOSIS — O24.419 GDM, CLASS A2: ICD-10-CM

## 2022-02-22 DIAGNOSIS — Z13.89 SCREENING FOR BLOOD OR PROTEIN IN URINE: Primary | ICD-10-CM

## 2022-02-22 PROBLEM — O24.410 GDM (GESTATIONAL DIABETES MELLITUS), CLASS A1: Status: RESOLVED | Noted: 2022-01-26 | Resolved: 2022-02-22

## 2022-02-22 LAB
GLUCOSE UR STRIP-MCNC: NEGATIVE MG/DL
PROT UR STRIP-MCNC: NEGATIVE MG/DL

## 2022-02-22 PROCEDURE — 0502F SUBSEQUENT PRENATAL CARE: CPT | Performed by: OBSTETRICS & GYNECOLOGY

## 2022-02-22 RX ORDER — IBUPROFEN 200 MG
1 TABLET ORAL 3 TIMES DAILY
Qty: 100 EACH | Refills: 1 | Status: SHIPPED | OUTPATIENT
Start: 2022-02-22 | End: 2022-03-08 | Stop reason: SDUPTHER

## 2022-02-22 NOTE — PROGRESS NOTES
Patient's fasting and 2-hour post breakfast blood sugars have been normal.  Her lunch blood sugars have continued to be elevated about 50% of the time from from 130-137.  2 of her dinner blood sugars have been elevated at 123 and 140.  Patient is following the diet well.  Baby is moving very actively.    Blood pressure 108/70 with no protein  Doppler heart tones are positive.  Fundal height is elevated at 35 cm.  Weight gain for pregnancy is 14 pounds.    Assessment-34 weeks  Gestational diabetes mellitus A2-recommend starting insulin due to elevated lunch and dinner blood sugars.  We will add 5 units with each meal.  We will go up by 2 units if the levels are not under 120.  Continue to check 4 times a day.  Recommend kick counts.  Start BPP's weekly next week and recheck weight in about 3 to 4 weeks.  Previous -patient is scheduled for  with no tubal ligation on   Anemia with hemoglobin of 10.8-continue iron  Asthma-continue inhalers  History of postop abscess due to GBS.  Plan for Ancef at time of .

## 2022-02-22 NOTE — TELEPHONE ENCOUNTER
Patient called stated that the pharmacy called to let her know that they need a PA for the humuLIN. States that they pharmacy is telling her 3/08 -3/18 before they can fill the Rx. Patient stated that the Pharmacy said they would be able to fill it as soon as the PA was completed. Pharmacy was said that they faxed the PA today, then instructed the patient to call us to follow up.

## 2022-03-08 ENCOUNTER — ROUTINE PRENATAL (OUTPATIENT)
Dept: OBSTETRICS AND GYNECOLOGY | Age: 32
End: 2022-03-08

## 2022-03-08 VITALS — WEIGHT: 206 LBS | DIASTOLIC BLOOD PRESSURE: 76 MMHG | BODY MASS INDEX: 36.49 KG/M2 | SYSTOLIC BLOOD PRESSURE: 108 MMHG

## 2022-03-08 DIAGNOSIS — Z98.891 PREVIOUS CESAREAN SECTION: ICD-10-CM

## 2022-03-08 DIAGNOSIS — O24.419 GDM, CLASS A2: ICD-10-CM

## 2022-03-08 DIAGNOSIS — O99.820 GBS (GROUP B STREPTOCOCCUS CARRIER), +RV CULTURE, CURRENTLY PREGNANT: ICD-10-CM

## 2022-03-08 DIAGNOSIS — Z13.89 SCREENING FOR BLOOD OR PROTEIN IN URINE: Primary | ICD-10-CM

## 2022-03-08 LAB
GLUCOSE UR STRIP-MCNC: NEGATIVE MG/DL
PROT UR STRIP-MCNC: NEGATIVE MG/DL

## 2022-03-08 PROCEDURE — 0502F SUBSEQUENT PRENATAL CARE: CPT | Performed by: OBSTETRICS & GYNECOLOGY

## 2022-03-08 RX ORDER — PEN NEEDLE, DIABETIC 29 G X1/2"
NEEDLE, DISPOSABLE MISCELLANEOUS
COMMUNITY
Start: 2022-02-22 | End: 2022-04-15

## 2022-03-08 NOTE — PROGRESS NOTES
Patient reports that her blood sugars are much better.  She did have 1 elevated lunch blood sugar.  She is using 5 units of fast acting insulin with lunch and 5 units with dinner.  Her fastings have been normal.  She notes good active fetal movements.    Biophysical profile is 8 out of 8.  ANITA is high normal at 23  Blood pressure 108/76 with no protein  Cervix is closed and thick  GBS was positive on prior urine culture.    Assessment-36 weeks  Gestational diabetes mellitus A2-continue 5 units of lispro with lunch and dinner.  Continue weekly BPP's and kick counts.  Previous [id number] is scheduled with no tubal ligation on   Anemia-continue iron.  Hemoglobin of 10.8  Asthma-continue inhalers  History of postop abscess due to GBS.  Plan for Ancef at time of .  Patient is very worried about this but we discussed that she had a 30-hour labor prior to her  which greatly increase the risk for endometritis and intra-abdominal infection.  We did discuss repeating Ancef dose 8 hours after  x1.

## 2022-03-09 ENCOUNTER — HOSPITAL ENCOUNTER (OUTPATIENT)
Facility: HOSPITAL | Age: 32
Setting detail: OBSERVATION
Discharge: HOME | End: 2022-03-09
Attending: OBSTETRICS & GYNECOLOGY | Admitting: OBSTETRICS & GYNECOLOGY

## 2022-03-09 ENCOUNTER — ROUTINE PRENATAL (OUTPATIENT)
Dept: OBSTETRICS AND GYNECOLOGY | Age: 32
End: 2022-03-09

## 2022-03-09 ENCOUNTER — TELEPHONE (OUTPATIENT)
Dept: OBSTETRICS AND GYNECOLOGY | Age: 32
End: 2022-03-09

## 2022-03-09 VITALS
OXYGEN SATURATION: 100 % | BODY MASS INDEX: 36.49 KG/M2 | DIASTOLIC BLOOD PRESSURE: 55 MMHG | TEMPERATURE: 98 F | HEART RATE: 88 BPM | HEIGHT: 63 IN | SYSTOLIC BLOOD PRESSURE: 102 MMHG | RESPIRATION RATE: 18 BRPM

## 2022-03-09 VITALS — SYSTOLIC BLOOD PRESSURE: 134 MMHG | BODY MASS INDEX: 36.49 KG/M2 | DIASTOLIC BLOOD PRESSURE: 92 MMHG | WEIGHT: 206 LBS

## 2022-03-09 DIAGNOSIS — O16.3 ELEVATED BLOOD PRESSURE AFFECTING PREGNANCY IN THIRD TRIMESTER, ANTEPARTUM: ICD-10-CM

## 2022-03-09 DIAGNOSIS — Z13.89 SCREENING FOR BLOOD OR PROTEIN IN URINE: ICD-10-CM

## 2022-03-09 DIAGNOSIS — O24.419 GDM, CLASS A2: ICD-10-CM

## 2022-03-09 DIAGNOSIS — Z3A.36 36 WEEKS GESTATION OF PREGNANCY: Primary | ICD-10-CM

## 2022-03-09 DIAGNOSIS — Z98.891 PREVIOUS CESAREAN SECTION: ICD-10-CM

## 2022-03-09 PROBLEM — Z34.90 PREGNANCY: Status: ACTIVE | Noted: 2022-03-09

## 2022-03-09 PROBLEM — R03.0 ELEVATED BLOOD PRESSURE READING: Status: ACTIVE | Noted: 2022-03-09

## 2022-03-09 LAB
ALBUMIN SERPL-MCNC: 3.5 G/DL (ref 3.5–5.2)
ALBUMIN/GLOB SERPL: 1.3 G/DL
ALP SERPL-CCNC: 97 U/L (ref 39–117)
ALT SERPL W P-5'-P-CCNC: 6 U/L (ref 1–33)
ANION GAP SERPL CALCULATED.3IONS-SCNC: 10 MMOL/L (ref 5–15)
AST SERPL-CCNC: 12 U/L (ref 1–32)
BACTERIA UR QL AUTO: ABNORMAL /HPF
BASOPHILS # BLD AUTO: 0.02 10*3/MM3 (ref 0–0.2)
BASOPHILS NFR BLD AUTO: 0.2 % (ref 0–1.5)
BILIRUB SERPL-MCNC: <0.2 MG/DL (ref 0–1.2)
BILIRUB UR QL STRIP: NEGATIVE
BUN SERPL-MCNC: 9 MG/DL (ref 6–20)
BUN/CREAT SERPL: 14.5 (ref 7–25)
CALCIUM SPEC-SCNC: 8.8 MG/DL (ref 8.6–10.5)
CHLORIDE SERPL-SCNC: 104 MMOL/L (ref 98–107)
CLARITY UR: CLEAR
CLARITY, POC: CLEAR
CO2 SERPL-SCNC: 19 MMOL/L (ref 22–29)
COLOR UR: YELLOW
COLOR UR: YELLOW
CREAT SERPL-MCNC: 0.62 MG/DL (ref 0.57–1)
CREAT UR-MCNC: 29.1 MG/DL
DEPRECATED RDW RBC AUTO: 41 FL (ref 37–54)
EGFRCR SERPLBLD CKD-EPI 2021: 121.5 ML/MIN/1.73
EOSINOPHIL # BLD AUTO: 0.08 10*3/MM3 (ref 0–0.4)
EOSINOPHIL NFR BLD AUTO: 1 % (ref 0.3–6.2)
ERYTHROCYTE [DISTWIDTH] IN BLOOD BY AUTOMATED COUNT: 12.6 % (ref 12.3–15.4)
GLOBULIN UR ELPH-MCNC: 2.7 GM/DL
GLUCOSE SERPL-MCNC: 91 MG/DL (ref 65–99)
GLUCOSE UR STRIP-MCNC: NEGATIVE MG/DL
GLUCOSE UR STRIP-MCNC: NEGATIVE MG/DL
HCT VFR BLD AUTO: 35.4 % (ref 34–46.6)
HGB BLD-MCNC: 11.6 G/DL (ref 12–15.9)
HGB UR QL STRIP.AUTO: ABNORMAL
HYALINE CASTS UR QL AUTO: ABNORMAL /LPF
IMM GRANULOCYTES # BLD AUTO: 0.05 10*3/MM3 (ref 0–0.05)
IMM GRANULOCYTES NFR BLD AUTO: 0.6 % (ref 0–0.5)
KETONES UR QL STRIP: NEGATIVE
LEUKOCYTE ESTERASE UR QL STRIP.AUTO: NEGATIVE
LYMPHOCYTES # BLD AUTO: 1.81 10*3/MM3 (ref 0.7–3.1)
LYMPHOCYTES NFR BLD AUTO: 22.1 % (ref 19.6–45.3)
MCH RBC QN AUTO: 28.8 PG (ref 26.6–33)
MCHC RBC AUTO-ENTMCNC: 32.8 G/DL (ref 31.5–35.7)
MCV RBC AUTO: 87.8 FL (ref 79–97)
MONOCYTES # BLD AUTO: 0.73 10*3/MM3 (ref 0.1–0.9)
MONOCYTES NFR BLD AUTO: 8.9 % (ref 5–12)
NEUTROPHILS NFR BLD AUTO: 5.49 10*3/MM3 (ref 1.7–7)
NEUTROPHILS NFR BLD AUTO: 67.2 % (ref 42.7–76)
NITRITE UR QL STRIP: NEGATIVE
NRBC BLD AUTO-RTO: 0 /100 WBC (ref 0–0.2)
PH UR STRIP.AUTO: 7 [PH] (ref 5–8)
PLATELET # BLD AUTO: 232 10*3/MM3 (ref 140–450)
PMV BLD AUTO: 10.1 FL (ref 6–12)
POTASSIUM SERPL-SCNC: 3.7 MMOL/L (ref 3.5–5.2)
PROT ?TM UR-MCNC: 7 MG/DL
PROT SERPL-MCNC: 6.2 G/DL (ref 6–8.5)
PROT UR QL STRIP: NEGATIVE
PROT UR STRIP-MCNC: NEGATIVE MG/DL
PROT/CREAT UR: 240.5 MG/G CREA (ref 0–200)
RBC # BLD AUTO: 4.03 10*6/MM3 (ref 3.77–5.28)
RBC # UR STRIP: ABNORMAL /HPF
REF LAB TEST METHOD: ABNORMAL
SODIUM SERPL-SCNC: 133 MMOL/L (ref 136–145)
SP GR UR STRIP: 1.01 (ref 1–1.03)
SQUAMOUS #/AREA URNS HPF: ABNORMAL /HPF
UROBILINOGEN UR QL STRIP: ABNORMAL
WBC # UR STRIP: ABNORMAL /HPF
WBC NRBC COR # BLD: 8.18 10*3/MM3 (ref 3.4–10.8)

## 2022-03-09 PROCEDURE — 96372 THER/PROPH/DIAG INJ SC/IM: CPT

## 2022-03-09 PROCEDURE — 96360 HYDRATION IV INFUSION INIT: CPT

## 2022-03-09 PROCEDURE — G0378 HOSPITAL OBSERVATION PER HR: HCPCS

## 2022-03-09 PROCEDURE — 84156 ASSAY OF PROTEIN URINE: CPT | Performed by: OBSTETRICS & GYNECOLOGY

## 2022-03-09 PROCEDURE — 0502F SUBSEQUENT PRENATAL CARE: CPT | Performed by: NURSE PRACTITIONER

## 2022-03-09 PROCEDURE — 63710000001 INSULIN ISOPHANE HUMAN PER 5 UNITS: Performed by: OBSTETRICS & GYNECOLOGY

## 2022-03-09 PROCEDURE — 82570 ASSAY OF URINE CREATININE: CPT | Performed by: OBSTETRICS & GYNECOLOGY

## 2022-03-09 PROCEDURE — 25010000002 TERBUTALINE PER 1 MG: Performed by: OBSTETRICS & GYNECOLOGY

## 2022-03-09 PROCEDURE — 80053 COMPREHEN METABOLIC PANEL: CPT | Performed by: OBSTETRICS & GYNECOLOGY

## 2022-03-09 PROCEDURE — 85025 COMPLETE CBC W/AUTO DIFF WBC: CPT | Performed by: OBSTETRICS & GYNECOLOGY

## 2022-03-09 PROCEDURE — 81001 URINALYSIS AUTO W/SCOPE: CPT | Performed by: OBSTETRICS & GYNECOLOGY

## 2022-03-09 PROCEDURE — 59025 FETAL NON-STRESS TEST: CPT

## 2022-03-09 RX ORDER — TERBUTALINE SULFATE 1 MG/ML
0.25 INJECTION, SOLUTION SUBCUTANEOUS ONCE
Status: COMPLETED | OUTPATIENT
Start: 2022-03-09 | End: 2022-03-09

## 2022-03-09 RX ORDER — NIFEDIPINE 10 MG/1
20 CAPSULE ORAL ONCE
Status: COMPLETED | OUTPATIENT
Start: 2022-03-09 | End: 2022-03-09

## 2022-03-09 RX ORDER — SODIUM CHLORIDE, SODIUM LACTATE, POTASSIUM CHLORIDE, CALCIUM CHLORIDE 600; 310; 30; 20 MG/100ML; MG/100ML; MG/100ML; MG/100ML
999 INJECTION, SOLUTION INTRAVENOUS ONCE
Status: COMPLETED | OUTPATIENT
Start: 2022-03-09 | End: 2022-03-09

## 2022-03-09 RX ORDER — SODIUM CHLORIDE 0.9 % (FLUSH) 0.9 %
10 SYRINGE (ML) INJECTION EVERY 8 HOURS
Status: DISCONTINUED | OUTPATIENT
Start: 2022-03-09 | End: 2022-03-09 | Stop reason: HOSPADM

## 2022-03-09 RX ORDER — ACETAMINOPHEN 500 MG
1000 TABLET ORAL ONCE
Status: COMPLETED | OUTPATIENT
Start: 2022-03-09 | End: 2022-03-09

## 2022-03-09 RX ORDER — NIFEDIPINE 20 MG/1
20 CAPSULE ORAL EVERY 6 HOURS PRN
Qty: 10 CAPSULE | Refills: 0 | Status: ON HOLD | OUTPATIENT
Start: 2022-03-09 | End: 2022-03-23

## 2022-03-09 RX ORDER — SODIUM CHLORIDE 9 MG/ML
10 INJECTION INTRAVENOUS EVERY 12 HOURS SCHEDULED
Status: DISCONTINUED | OUTPATIENT
Start: 2022-03-09 | End: 2022-03-09

## 2022-03-09 RX ADMIN — SODIUM CHLORIDE, POTASSIUM CHLORIDE, SODIUM LACTATE AND CALCIUM CHLORIDE 999 ML/HR: 600; 310; 30; 20 INJECTION, SOLUTION INTRAVENOUS at 15:56

## 2022-03-09 RX ADMIN — INSULIN HUMAN 5 UNITS: 100 INJECTION, SUSPENSION SUBCUTANEOUS at 19:35

## 2022-03-09 RX ADMIN — Medication 10 ML: at 20:44

## 2022-03-09 RX ADMIN — TERBUTALINE SULFATE 0.25 MG: 1 INJECTION SUBCUTANEOUS at 17:22

## 2022-03-09 RX ADMIN — ACETAMINOPHEN 1000 MG: 500 TABLET ORAL at 15:57

## 2022-03-09 RX ADMIN — NIFEDIPINE 20 MG: 10 CAPSULE ORAL at 19:14

## 2022-03-09 NOTE — TELEPHONE ENCOUNTER
THERESA pt stated she is having acid reflux, some stomach cramping and fatigue from not sleeping well last night but fetal movement has been normal. States she will have her father who is a retired registered nurse come over and check her BP and call us with results.

## 2022-03-09 NOTE — TELEPHONE ENCOUNTER
Dr Fisher pt calls 36w2d with c/o blurred vision/unable to focus, slightly light headed. Pt states she did not sleep well last night which she thinks may be why she is experiencing this. PT has gestational diabetes, her sugars are 91 this morning fasting, after breakfast around 9am she is at 89, has not eaten since 9am and will be testing again after lunch. Her BP and was checked in office yesterday and was normal. She has not checked her BP today, please advise if you would like pt to come in for evaluation or go to L&D?

## 2022-03-09 NOTE — TELEPHONE ENCOUNTER
Patient called back, states that she is still having blurry vision. She was not able to get her BP taken. Her Blood sugar was 73. States that she is having normal fetal movement.  Scheduling appointment to be seen today.

## 2022-03-09 NOTE — PROGRESS NOTES
Chief Complaint   Patient presents with   • Pregnancy Problem     Blurry vision, started today, not sleeping well.       HPI: 31 y.o.  at 36w2d     Pt presents today for add on OB visit  She has chief complaint of visual changes (blurriness) that started this morning around 10 am  This has improved some since then  She has also noted a dull headache   Denies LE edema or RUQ abdominal pain  Her repeat blood pressure was 132/80  She is GDM managed on insulin - her blood sugars have been normal. Her most recent reading was 73 which is a little low for her.   Has also had some nausea and reflux today as well.  Pt of Dr. Phillip Walters:    22 1348   BP: 134/92   Weight: 93.4 kg (206 lb)       ROS:  GI:  Negative  : Negative  Pulmonary: Negative   Neuro: HA, visual changes  GI: reflux, nausea     A/P  1. Intrauterine pregnancy at 36w2d   2. Pregnancy Risk:  HIGH RISK    Diagnoses and all orders for this visit:    1. 36 weeks gestation of pregnancy (Primary)    2. Screening for blood or protein in urine  -     POC Urinalysis Dipstick    3. GDM, class A2    4. Previous  section    5. Anemia of mother during pregnancy, delivered    6. Elevated blood pressure affecting pregnancy in third trimester, antepartum        -----------------------  PLAN:   Sent to L&D for further evaluation. On call MD Dr Oneal notified.  No follow-ups on file.      Cyndi Cifuentes, SANTOS  3/9/2022 14:21 EST

## 2022-03-10 ENCOUNTER — TELEPHONE (OUTPATIENT)
Dept: OBSTETRICS AND GYNECOLOGY | Age: 32
End: 2022-03-10

## 2022-03-10 NOTE — PROGRESS NOTES
[facility] Provider Note        3/9/2022 20:59 JAGJIT Aguilar is a 31 y.o.  at 36w2d ESTUARDO  2022, by Ultrasound who presents for : Hypertension-Pregnant (/Patient reports BP's elevated in the office. Patient reports fuzzy vision, HA 7/10. +FM, -LF, feels contractions intermittently. )          HPI:  No VB/LOF/HA/visual changes/N/V/F/C/dysuria     Active fetus Yes   denies ROM  reportscontractions, cramping   denies bleeding    Prenatal care with Chris Fisher MD complicated by GDMA 2     Patient Active Problem List    Diagnosis    • *Previous  section [Z98.891]    • Pregnancy [Z34.90]    • GDM, class A2 [O24.419]    • Anemia of mother during pregnancy, delivered [O99.02]    • Mild intermittent asthma [J45.20]    • Back pain affecting pregnancy [O99.891, M54.9]    • GBS (group B Streptococcus carrier), +RV culture, currently pregnant [O99.820]    • Anxiety [F41.9]          POB:   OB History    Para Term  AB Living   2 1 1 0 0 1   SAB IAB Ectopic Molar Multiple Live Births   0 0 0 0 0 1      # Outcome Date GA Lbr Smith/2nd Weight Sex Delivery Anes PTL Lv   2 Current            1 Term 19 41w0d  3430 g (7 lb 9 oz) F CS-LVertical  N SUN      PMH:   Past Medical History:   Diagnosis Date   • Anxiety    • Gestational diabetes    • IBS (irritable bowel syndrome)      PSH:   Past Surgical History:   Procedure Laterality Date   • ABSCESS DRAINAGE      right side   •  SECTION       FH:    Family History   Problem Relation Age of Onset   • Breast cancer Maternal Grandmother 55   • Diabetes Maternal Grandmother    • Lung cancer Paternal Grandfather 60   • Diabetes Mother      SH:   Social History     Socioeconomic History   • Marital status:    Tobacco Use   • Smoking status: Never Smoker   • Smokeless tobacco: Never Used   Vaping Use   • Vaping Use: Never used   Substance and Sexual Activity   • Alcohol use: Not Currently   • Drug use: Never   • Sexual  "activity: Yes     Partners: Male     Birth control/protection: None       Allergies: Augmentin [amoxicillin-pot clavulanate] and Sulfa antibiotics    Medications:   Medications Prior to Admission   Medication Sig Dispense Refill Last Dose   • budesonide (PULMICORT) 90 MCG/ACT inhaler Inhale 1 puff 2 (Two) Times a Day. 1 each 11 Patient Taking Differently at Unknown time   • ferrous gluconate 324 (37.5 Fe) MG tablet tablet Take  by mouth Daily With Breakfast.   Past Month at Unknown time   • insulin regular (humuLIN R,novoLIN R) 100 UNIT/ML injection 5 units with lunch and dinner 200 mL 1 3/9/2022 at 1245   • albuterol sulfate  (90 Base) MCG/ACT inhaler Inhale 2 puffs Every 4 (Four) Hours As Needed.   More than a month at Unknown time   • B-D INS SYR ULTRAFINE 1CC/30G 30G X 1/2\" 1 ML misc USE TO INJECT INSUILIN TWICE A DAY      • Influenza Vac Split Quad 0.5 ML suspension injection Inject 0.5 mL into the appropriate muscle as directed by prescriber 1 (One) Time.      • OneTouch Delica Lancets 33G misc       • OneTouch Verio test strip       • prenatal vitamin (prenatal, CLASSIC, vitamin) tablet Take  by mouth Daily.   More than a month at Unknown time       Review of Systems  A 14 system review was completed and negative except for what is noted in the HPI or below  Pertinent items are noted in HPI      Physical exam    Temp:  [98 °F (36.7 °C)-98.2 °F (36.8 °C)] 98 °F (36.7 °C)  Heart Rate:  [] 93  Resp:  [18] 18  BP: (110-135)/(60-92) 112/71  Estimated body mass index is 36.49 kg/m² as calculated from the following:    Height as of this encounter: 160 cm (63\").    Weight as of an earlier encounter on 3/9/22: 93.4 kg (206 lb).    General appearance - alert, well appearing, and in no distress  Abdomen - soft, nontender, nondistended, no masses or organomegaly  Musculoskeletal - no joint tenderness, deformity or swelling  Extremities - peripheral pulses normal, no pedal edema, no clubbing or cyanosis  Skin " - normal coloration and turgor, no rashes, no suspicious skin lesions noted      Uterine Activity Assessment  Method: palpation, per patient report, external tocotransducer  Contraction Frequency (Minutes): 1.5-4  Contractions in 10 Minutes: 4-5  Contraction Duration (sec): 20-40  Contraction Intensity: mild by palpation  Uterine Resting Tone: soft by palpation  Uterine Tenderness: No  Contraction Pattern: irregular    Membranes: Intact           Vaginal Exam  Vaginal Bleeding: pink (pt. noticed little amount of pink discharge when last wiped 2015)               U/S reviewed: not performed.       External Prenatal Results     Pregnancy Outside Results - Transcribed From Office Records - See Scanned Records For Details     Test Value Date Time    ABO  O  09/02/21 1155    Rh  Positive  09/02/21 1155    Antibody Screen  Negative  09/02/21 1155    Varicella IgG ^ Pos H/O   09/20/21     Rubella  5.19 index 09/02/21 1155    Hgb  11.6 g/dL 03/09/22 1502       10.8 g/dL 01/14/22 1352       11.2 g/dL 12/17/21 0930       12.6 g/dL 09/02/21 1155    Hct  35.4 % 03/09/22 1502       32.7 % 01/14/22 1352       33.9 % 12/17/21 0930       38.0 % 09/02/21 1155    Glucose Fasting GTT  82 mg/dL 01/24/22 0821    Glucose Tolerance Test 1 hour  233 mg/dL 01/24/22 0821    Glucose Tolerance Test 3 hour  168 mg/dL 01/24/22 0821    Gonorrhea (discrete)       Chlamydia (discrete)       RPR  Non Reactive  09/02/21 1155    VDRL       Syphilis Antibody ^ <0.2 AI 05/13/19 1046    HBsAg  Negative  09/02/21 1155    Herpes Simplex Virus PCR       Herpes Simplex VIrus Culture       HIV  Non Reactive  09/02/21 1155    Hep C RNA Quant PCR       Hep C Antibody  <0.1 s/co ratio 09/02/21 1155    AFP  21.8 ng/mL 10/18/21 1503    Group B Strep       GBS Susceptibility to Clindamycin       GBS Susceptibility to Erythromycin       Fetal Fibronectin       Genetic Testing, Maternal Blood             Drug Screening     Test Value Date Time    Urine Drug Screen        Amphetamine Screen ^ Negative (arb'U) 19 1328    Barbiturate Screen       Benzodiazepine Screen       Methadone Screen       Phencyclidine Screen       Opiates Screen ^ Negative  19 1328    THC Screen       Cocaine Screen       Propoxyphene Screen       Buprenorphine Screen       Methamphetamine Screen       Oxycodone Screen       Tricyclic Antidepressants Screen             Legend    ^: Historical                          Impression:   @ 36w2d .  Final Diagnosis: Elevated BP reading in office   Labs wnl and BP normal range here     ctx - appears comfortable - no cervical change     Plan:  1. Discharge home   2. Plan of care has been reviewed with patient and nurse  3.  Risks, benefits of treatment plan have been discussed.  4.  All questions have been answered.  5. Home with procardia prn for comfort  6. Strict labor warnings/PreE warnings         I discussed the patients findings and my recommendations with patient and nursing staff      Mihaela Oneal MD  3/9/2022  20:59 EST

## 2022-03-10 NOTE — TELEPHONE ENCOUNTER
Pt calls wanting to follow up with you about her visit yesterday. Pt was seen yesterday by Cyndi Cifuentes who sent pt to L&D, while she was there she started having consistent contractions and was placed on a new medication, nifedipine. Please advise if you want to see pt sooner than 03/17

## 2022-03-10 NOTE — NURSING NOTE
Discharge indstructions discussed with pt. She is to call office in the morning to set up a follow up appointment with them. Pt. May have 20mg procardia every 6 hours as needed once [facility] Pt. Verbalized understanding and is being escorted by her  and driven home tonight

## 2022-03-10 NOTE — TELEPHONE ENCOUNTER
Patient was sent to the hospital yesterday after headache.  Her blood pressures at the hospital were normal but she had persistent contractions and was started on Procardia.  Patient had low blood pressure after taking it this morning to 80/40 and felt dizzy.  I recommend the patient stop the medication and see me tomorrow at 930.  She will go to the hospital if she has regular painful contractions, decreased fetal movement or persistent headache.    Please add onto my schedule at 930 tomorrow.

## 2022-03-11 ENCOUNTER — HOSPITAL ENCOUNTER (OUTPATIENT)
Facility: HOSPITAL | Age: 32
Setting detail: OBSERVATION
Discharge: HOME | End: 2022-03-12
Attending: OBSTETRICS & GYNECOLOGY | Admitting: OBSTETRICS & GYNECOLOGY

## 2022-03-11 ENCOUNTER — ROUTINE PRENATAL (OUTPATIENT)
Dept: OBSTETRICS AND GYNECOLOGY | Age: 32
End: 2022-03-11

## 2022-03-11 VITALS — BODY MASS INDEX: 36.14 KG/M2 | WEIGHT: 204 LBS | SYSTOLIC BLOOD PRESSURE: 118 MMHG | DIASTOLIC BLOOD PRESSURE: 74 MMHG

## 2022-03-11 DIAGNOSIS — O99.820 GBS (GROUP B STREPTOCOCCUS CARRIER), +RV CULTURE, CURRENTLY PREGNANT: ICD-10-CM

## 2022-03-11 DIAGNOSIS — Z13.89 SCREENING FOR BLOOD OR PROTEIN IN URINE: Primary | ICD-10-CM

## 2022-03-11 DIAGNOSIS — Z98.891 PREVIOUS CESAREAN SECTION: ICD-10-CM

## 2022-03-11 DIAGNOSIS — O24.419 GDM, CLASS A2: ICD-10-CM

## 2022-03-11 DIAGNOSIS — F41.9 ANXIETY: ICD-10-CM

## 2022-03-11 DIAGNOSIS — Z34.90 PREGNANCY, UNSPECIFIED GESTATIONAL AGE: ICD-10-CM

## 2022-03-11 PROBLEM — E87.6 HYPOKALEMIA: Status: ACTIVE | Noted: 2022-03-11

## 2022-03-11 LAB
ALBUMIN SERPL-MCNC: 3.4 G/DL (ref 3.5–5.2)
ALBUMIN/GLOB SERPL: 1.3 G/DL
ALP SERPL-CCNC: 96 U/L (ref 39–117)
ALT SERPL W P-5'-P-CCNC: 7 U/L (ref 1–33)
ANION GAP SERPL CALCULATED.3IONS-SCNC: 10 MMOL/L (ref 5–15)
AST SERPL-CCNC: 13 U/L (ref 1–32)
BASOPHILS # BLD AUTO: 0.02 10*3/MM3 (ref 0–0.2)
BASOPHILS NFR BLD AUTO: 0.3 % (ref 0–1.5)
BILIRUB SERPL-MCNC: 0.2 MG/DL (ref 0–1.2)
BUN SERPL-MCNC: 12 MG/DL (ref 6–20)
BUN/CREAT SERPL: 15.2 (ref 7–25)
CALCIUM SPEC-SCNC: 8.7 MG/DL (ref 8.6–10.5)
CHLORIDE SERPL-SCNC: 105 MMOL/L (ref 98–107)
CO2 SERPL-SCNC: 20 MMOL/L (ref 22–29)
CREAT SERPL-MCNC: 0.79 MG/DL (ref 0.57–1)
DEPRECATED RDW RBC AUTO: 39.9 FL (ref 37–54)
EGFRCR SERPLBLD CKD-EPI 2021: 102.1 ML/MIN/1.73
EOSINOPHIL # BLD AUTO: 0.06 10*3/MM3 (ref 0–0.4)
EOSINOPHIL NFR BLD AUTO: 0.8 % (ref 0.3–6.2)
ERYTHROCYTE [DISTWIDTH] IN BLOOD BY AUTOMATED COUNT: 12.4 % (ref 12.3–15.4)
GLOBULIN UR ELPH-MCNC: 2.6 GM/DL
GLUCOSE BLDC GLUCOMTR-MCNC: 117 MG/DL (ref 70–130)
GLUCOSE SERPL-MCNC: 105 MG/DL (ref 65–99)
GLUCOSE UR STRIP-MCNC: NEGATIVE MG/DL
HCT VFR BLD AUTO: 31.9 % (ref 34–46.6)
HGB BLD-MCNC: 10.6 G/DL (ref 12–15.9)
IMM GRANULOCYTES # BLD AUTO: 0.04 10*3/MM3 (ref 0–0.05)
IMM GRANULOCYTES NFR BLD AUTO: 0.6 % (ref 0–0.5)
LYMPHOCYTES # BLD AUTO: 1.45 10*3/MM3 (ref 0.7–3.1)
LYMPHOCYTES NFR BLD AUTO: 20 % (ref 19.6–45.3)
MAGNESIUM SERPL-MCNC: 1.8 MG/DL (ref 1.6–2.6)
MCH RBC QN AUTO: 29.3 PG (ref 26.6–33)
MCHC RBC AUTO-ENTMCNC: 33.2 G/DL (ref 31.5–35.7)
MCV RBC AUTO: 88.1 FL (ref 79–97)
MONOCYTES # BLD AUTO: 0.59 10*3/MM3 (ref 0.1–0.9)
MONOCYTES NFR BLD AUTO: 8.1 % (ref 5–12)
NEUTROPHILS NFR BLD AUTO: 5.1 10*3/MM3 (ref 1.7–7)
NEUTROPHILS NFR BLD AUTO: 70.2 % (ref 42.7–76)
NRBC BLD AUTO-RTO: 0 /100 WBC (ref 0–0.2)
PLATELET # BLD AUTO: 213 10*3/MM3 (ref 140–450)
PMV BLD AUTO: 9.7 FL (ref 6–12)
POTASSIUM SERPL-SCNC: 3.3 MMOL/L (ref 3.5–5.2)
PROT SERPL-MCNC: 6 G/DL (ref 6–8.5)
PROT UR STRIP-MCNC: ABNORMAL MG/DL
RBC # BLD AUTO: 3.62 10*6/MM3 (ref 3.77–5.28)
SARS-COV-2 RNA RESP QL NAA+PROBE: NOT DETECTED
SODIUM SERPL-SCNC: 135 MMOL/L (ref 136–145)
WBC NRBC COR # BLD: 7.26 10*3/MM3 (ref 3.4–10.8)

## 2022-03-11 PROCEDURE — 85025 COMPLETE CBC W/AUTO DIFF WBC: CPT | Performed by: OBSTETRICS & GYNECOLOGY

## 2022-03-11 PROCEDURE — 0502F SUBSEQUENT PRENATAL CARE: CPT | Performed by: OBSTETRICS & GYNECOLOGY

## 2022-03-11 PROCEDURE — 83735 ASSAY OF MAGNESIUM: CPT | Performed by: OBSTETRICS & GYNECOLOGY

## 2022-03-11 PROCEDURE — 59025 FETAL NON-STRESS TEST: CPT | Performed by: OBSTETRICS & GYNECOLOGY

## 2022-03-11 PROCEDURE — G0378 HOSPITAL OBSERVATION PER HR: HCPCS

## 2022-03-11 PROCEDURE — C9803 HOPD COVID-19 SPEC COLLECT: HCPCS

## 2022-03-11 PROCEDURE — U0003 INFECTIOUS AGENT DETECTION BY NUCLEIC ACID (DNA OR RNA); SEVERE ACUTE RESPIRATORY SYNDROME CORONAVIRUS 2 (SARS-COV-2) (CORONAVIRUS DISEASE [COVID-19]), AMPLIFIED PROBE TECHNIQUE, MAKING USE OF HIGH THROUGHPUT TECHNOLOGIES AS DESCRIBED BY CMS-2020-01-R: HCPCS | Performed by: OBSTETRICS & GYNECOLOGY

## 2022-03-11 PROCEDURE — 82962 GLUCOSE BLOOD TEST: CPT

## 2022-03-11 PROCEDURE — 59025 FETAL NON-STRESS TEST: CPT

## 2022-03-11 PROCEDURE — 80053 COMPREHEN METABOLIC PANEL: CPT | Performed by: OBSTETRICS & GYNECOLOGY

## 2022-03-11 RX ORDER — POTASSIUM CHLORIDE 750 MG/1
40 TABLET, FILM COATED, EXTENDED RELEASE ORAL AS NEEDED
Status: DISCONTINUED | OUTPATIENT
Start: 2022-03-11 | End: 2022-03-12 | Stop reason: HOSPADM

## 2022-03-11 RX ORDER — PRENATAL VIT/IRON FUM/FOLIC AC 27MG-0.8MG
1 TABLET ORAL DAILY
Status: DISCONTINUED | OUTPATIENT
Start: 2022-03-12 | End: 2022-03-12 | Stop reason: HOSPADM

## 2022-03-11 RX ORDER — POTASSIUM CHLORIDE 1.5 G/1.77G
40 POWDER, FOR SOLUTION ORAL AS NEEDED
Status: DISCONTINUED | OUTPATIENT
Start: 2022-03-11 | End: 2022-03-12 | Stop reason: HOSPADM

## 2022-03-11 RX ORDER — FERROUS SULFATE 325(65) MG
325 TABLET ORAL
Status: DISCONTINUED | OUTPATIENT
Start: 2022-03-12 | End: 2022-03-12 | Stop reason: HOSPADM

## 2022-03-11 RX ORDER — ACETAMINOPHEN 500 MG
1000 TABLET ORAL EVERY 6 HOURS PRN
Status: DISCONTINUED | OUTPATIENT
Start: 2022-03-11 | End: 2022-03-12 | Stop reason: HOSPADM

## 2022-03-11 RX ADMIN — POTASSIUM CHLORIDE 40 MEQ: 10 TABLET, EXTENDED RELEASE ORAL at 20:04

## 2022-03-11 NOTE — PLAN OF CARE
Goal Outcome Evaluation:  Plan of Care Reviewed With: patient        Progress: no change  Outcome Evaluation: VSS. Pt reports +FM, blurry vision and mild HA. Pt denies LOF, VB, regular CTX or epigastric pain. BP WNL. 24H Urine collection began today, 3/11/22 @ 1510.

## 2022-03-11 NOTE — H&P
[facility]  Obstetric History and Physical    No chief complaint on file.      Subjective     Patient is a 31 y.o. female  currently at 36w4d seen in the office today with 1+ protein.  Patient has had some intermittent elevated blood pressures, headaches and visual changes.  Patient was seen at the hospital on Wednesday and her platelet count and liver enzymes were normal.  She does not have a history of preeclampsia.      Pregnancy is complicated by gestational diabetes A2.  Patient is taking 5 units of lispro insulin with lunch and dinner.  Blood sugars have been normal.  Patient has history of  with her last pregnancy that was complicated by abscess postpartum.        The following portions of the patients history were reviewed and updated as appropriate: past medical history, past surgical history, past family history, past social history and problem list .       Prenatal Information:  Prenatal Results     POC Urine Glucose/Protein     Test Value Reference Range Date Time    Urine Glucose  Negative mg/dL Negative, 1000 mg/dL (3+) 22    Urine Protein  1+ mg/dL Negative 22 0932          Initial Prenatal Labs     Test Value Reference Range Date Time    Hemoglobin  11.6 g/dL 12.0 - 15.9 22 1502       10.8 g/dL 11.1 - 15.9 22 1352       11.2 g/dL 11.1 - 15.9 21 0930       12.6 g/dL 11.1 - 15.9 21 1155    Hematocrit  35.4 % 34.0 - 46.6 22 1502       32.7 % 34.0 - 46.6 22 1352       33.9 % 34.0 - 46.6 21 0930       38.0 % 34.0 - 46.6 21 1155    Platelets  232 10*3/mm3 140 - 450 22 1502       210 x10E3/uL 150 - 450 22 1352       248 x10E3/uL 150 - 450 21 0930       264 x10E3/uL 150 - 450 21 1155    Rubella IgG  5.19 index Immune >0.99 21 1155    Hepatitis B SAg  Negative  Negative 21 1155    Hepatitis C Ab  <0.1 s/co ratio 0.0 - 0.9 21 1155    RPR  Non Reactive  Non Reactive 21 1155    ABO   O   09/02/21 1155    Rh  Positive   09/02/21 1155    Antibody Screen  Negative  Negative 09/02/21 1155    HIV  Non Reactive  Non Reactive 09/02/21 1155    Urine Culture  Final report   09/02/21 1155    Gonorrhea        Chlamydia        TSH ^ 4.120 u(iU)/mL 0.27 - 4.20 03/05/18 1145    HgB A1c   5.4 % 4.8 - 5.6 09/02/21 1155          2nd and 3rd Trimester     Test Value Reference Range Date Time    Hemoglobin (repeated)  11.6 g/dL 12.0 - 15.9 03/09/22 1502       10.8 g/dL 11.1 - 15.9 01/14/22 1352       11.2 g/dL 11.1 - 15.9 12/17/21 0930    Hematocrit (repeated)  35.4 % 34.0 - 46.6 03/09/22 1502       32.7 % 34.0 - 46.6 01/14/22 1352       33.9 % 34.0 - 46.6 12/17/21 0930    Platelets   232 10*3/mm3 140 - 450 03/09/22 1502       210 x10E3/uL 150 - 450 01/14/22 1352       248 x10E3/uL 150 - 450 12/17/21 0930       264 x10E3/uL 150 - 450 09/02/21 1155    GCT  158 mg/dL 65 - 139 01/14/22 1352    Antibody Screen (repeated)        GTT Fasting  82 mg/dL 65 - 94 01/24/22 0821    GTT 1 Hr  233 mg/dL 65 - 179 01/24/22 0821    GTT 2 Hr  181 mg/dL 65 - 154 01/24/22 0821    GTT 3 Hr  168 mg/dL 65 - 139 01/24/22 0821    Group B Strep              Drug Screening     Test Value Reference Range Date Time    Amphetamine Screen        Barbiturate Screen        Benzodiazepine Screen        Methadone Screen        Phencyclidine Screen        Opiates Screen        THC Screen        Cocaine Screen        Propoxyphene Screen        Buprenorphine Screen        Methamphetamine Screen        Oxycodone Screen        Tricyclic Antidepressants Screen              Other (Risk screening)     Test Value Reference Range Date Time    Varicella IgG ^ Pos H/O    09/20/21     Parvovirus IgG        CMV IgG        Cystic Fibrosis        Hemoglobin electrophoresis        NIPT        MSAFP-4        AFP (for NTD only)  *Screen Negative*   10/18/21 1503          Legend    ^: Historical                      External Prenatal Results     Pregnancy Outside  Results - Transcribed From Office Records - See Scanned Records For Details     Test Value Date Time    ABO  O  21 1155    Rh  Positive  21 1155    Antibody Screen  Negative  21 1155    Varicella IgG ^ Pos H/O   21     Rubella  5.19 index 21 1155    Hgb  11.6 g/dL 22 1502       10.8 g/dL 22 1352       11.2 g/dL 21 0930       12.6 g/dL 21 1155    Hct  35.4 % 22 1502       32.7 % 22 1352       33.9 % 21 0930       38.0 % 21 1155    Glucose Fasting GTT  82 mg/dL 22 0821    Glucose Tolerance Test 1 hour  233 mg/dL 22 0821    Glucose Tolerance Test 3 hour  168 mg/dL 22 0821    Gonorrhea (discrete)       Chlamydia (discrete)       RPR  Non Reactive  21 1155    VDRL       Syphilis Antibody ^ <0.2 AI 19 1046    HBsAg  Negative  21 1155    Herpes Simplex Virus PCR       Herpes Simplex VIrus Culture       HIV  Non Reactive  21 1155    Hep C RNA Quant PCR       Hep C Antibody  <0.1 s/co ratio 21 1155    AFP  21.8 ng/mL 10/18/21 1503    Group B Strep       GBS Susceptibility to Clindamycin       GBS Susceptibility to Erythromycin       Fetal Fibronectin       Genetic Testing, Maternal Blood             Drug Screening     Test Value Date Time    Urine Drug Screen       Amphetamine Screen ^ Negative (arb'U) 19 1328    Barbiturate Screen       Benzodiazepine Screen       Methadone Screen       Phencyclidine Screen       Opiates Screen ^ Negative  19 1328    THC Screen       Cocaine Screen       Propoxyphene Screen       Buprenorphine Screen       Methamphetamine Screen       Oxycodone Screen       Tricyclic Antidepressants Screen             Legend    ^: Historical                         Past OB History:     OB History    Para Term  AB Living   2 1 1 0 0 1   SAB IAB Ectopic Molar Multiple Live Births   0 0 0 0 0 1      # Outcome Date GA Lbr Smith/2nd Weight Sex Delivery Anes PTL Lv    2 Current            1 Term 19 41w0d  3430 g (7 lb 9 oz) F CS-LVertical  N SUN       Past Medical History: Past Medical History:   Diagnosis Date   • Anxiety    • Gestational diabetes    • IBS (irritable bowel syndrome)       Past Surgical History Past Surgical History:   Procedure Laterality Date   • ABSCESS DRAINAGE      right side   •  SECTION        Family History: Family History   Problem Relation Age of Onset   • Breast cancer Maternal Grandmother 55   • Diabetes Maternal Grandmother    • Lung cancer Paternal Grandfather 60   • Diabetes Mother       Social History:  reports that she has never smoked. She has never used smokeless tobacco.   reports previous alcohol use.   reports no history of drug use.        General ROS: Reports good fetal movements.  She is having intermittent contractions but cervix was closed in the office.  No vaginal bleeding.  Intermittent headaches and blurry vision.    Objective       Vital Signs Range for the last 24 hours  Temperature:     Temp Source:     BP: BP: (118)/(74) 118/74   Pulse:     Respirations:     SPO2:     O2 Amount (l/min):     O2 Devices     Weight: Weight:  [92.5 kg (204 lb)] 92.5 kg (204 lb)     Physical Examination: General appearance - alert, well appearing, and in no distress  Mental status - normal mood, behavior, speech, dress, motor activity, and thought processes  Eyes - sclera anicteric  Chest - clear to auscultation, no wheezes, rales or rhonchi, symmetric air entry  Heart - normal rate, regular rhythm, normal S1, S2, no murmurs, rubs, clicks or gallops  Abdomen -gravid, size equal to dates and nontender  Pelvic -cervix is anterior closed and thick.  Neurological - DTR's normal and symmetric  Extremities - no pedal edema noted    Presentation: vertex   Cervix: Exam by:     Dilation:     Effacement:     Station:         Fetal Heart Rate Assessment   Method:     Beats/min:     Baseline:     Variability:     Accels:     Decels:     Tracing  Category:       Uterine Assessment   Method:     Frequency (min):     Ctx Count in 10 min:     Duration:     Intensity:     Intensity by IUPC:     Resting Tone:     Resting Tone by IUPC:     [place] Units:           Assessment/Plan       Previous  section    GBS (group B Streptococcus carrier), +RV culture, currently pregnant    Anxiety    GDM, class A2    Elevated blood pressure reading        Assessment:  1.  Intrauterine pregnancy at 36w4d gestation with proteinuria and some symptoms of preeclampsia.  Patient's blood pressure is normal in the office but she did have some elevations a few days ago.  2.  Recommend admission for 24-hour urine and evaluation.  3.  Obstetrical history significant for  after long 30-hour labor complicated by GBS abscess..  4.  GBS status: Positive by urine culture    Plan:  1. fetal and uterine monitoring  continuously and 24 hour urine for  total protein and creatinine clearance  2. Plan of care has been reviewed with patient  3.  Risks, benefits of treatment plan have been discussed.  4.  All questions have been answered.        Chris Fisher MD  3/11/2022  15:11 EST

## 2022-03-11 NOTE — NON STRESS TEST
Tracie Aguilar, a  at 36w4d with an ESTUARDO of 2022, by Ultrasound, was seen at [facility] ANTEPARTUM UNIT for a nonstress test.    Chief Complaint   Patient presents with   • Headache     ANTEPARTUM - pt reports headache and proteinuria without severe BPs. Sent from office for pre-eclampsia workup. Reports some mild contractions. Denies leaking fluid, vaginal bleeding. Reports good fetal movement.       Patient Active Problem List   Diagnosis   • Previous  section   • GBS (group B Streptococcus carrier), +RV culture, currently pregnant   • Anxiety   • Back pain affecting pregnancy   • Mild intermittent asthma   • GDM, class A2   • Anemia of mother during pregnancy, delivered   • Pregnancy   • Elevated blood pressure reading       Start Time: 1521  Stop Time: 1605    Interpretation A  Nonstress Test Interpretation A: Reactive (22 1605 : Perri Rowland, RN)

## 2022-03-11 NOTE — PROGRESS NOTES
Patient was sent over the hospital on Wednesday after she had one elevated blood pressure and some blurry vision.  She also had a significant headache.  She had normal labs but did have some proteinuria.  Patient reports her headache has resolved but she is having intermittent contractions.  At the hospital she was given Procardia due to persistent contractions.  Her cervix was closed.  She had an episode yesterday of low blood pressure with systolic of 80 so she stopped taking the Procardia.  Patient reports her blood sugars fasting and 2-hour postprandials have been normal.  She is taking 5 units of insulin with lunch and dinner.    Cervix is closed and thick.  Blood pressure 118/74 with 1+ protein  Lower extremities with no edema and normal reflexes  Doppler heart tones are positive and fundal height is appropriate.    Assessment-36 weeks 4 days  Patient has had some elevated blood pressures and has 1+ proteinuria today.  She is also had some blurry vision.  Her protein creatinine ratio at the hospital was mildly elevated.  Discussed she has had some signs of preeclampsia but normal blood pressure today.  Recommend patient go over to the hospital and we admit for 24-hour urine and observation.  Discussed if she rules in for preeclampsia we would do her  at 37 weeks on Tuesday.  Gestational diabetes mellitus A2-continue 5 units of lispro with lunch and dinner.  Continue weekly BPP's and kick counts.  Previous   Anemia hemoglobin is improved at 11.6  Asthma-continue inhalers  History of postop abscess due to GBS.  Plan for Ancef at time of .

## 2022-03-12 VITALS
WEIGHT: 204 LBS | DIASTOLIC BLOOD PRESSURE: 62 MMHG | HEART RATE: 62 BPM | BODY MASS INDEX: 36.14 KG/M2 | OXYGEN SATURATION: 100 % | TEMPERATURE: 98.4 F | SYSTOLIC BLOOD PRESSURE: 109 MMHG | RESPIRATION RATE: 16 BRPM | HEIGHT: 63 IN

## 2022-03-12 LAB
ALBUMIN SERPL-MCNC: 3.2 G/DL (ref 3.5–5.2)
ALBUMIN/GLOB SERPL: 1.4 G/DL
ALP SERPL-CCNC: 89 U/L (ref 39–117)
ALT SERPL W P-5'-P-CCNC: 6 U/L (ref 1–33)
ANION GAP SERPL CALCULATED.3IONS-SCNC: 9 MMOL/L (ref 5–15)
AST SERPL-CCNC: 11 U/L (ref 1–32)
BASOPHILS # BLD AUTO: 0.04 10*3/MM3 (ref 0–0.2)
BASOPHILS NFR BLD AUTO: 0.6 % (ref 0–1.5)
BILIRUB SERPL-MCNC: 0.2 MG/DL (ref 0–1.2)
BUN SERPL-MCNC: 10 MG/DL (ref 6–20)
BUN/CREAT SERPL: 14.9 (ref 7–25)
CALCIUM SPEC-SCNC: 8.4 MG/DL (ref 8.6–10.5)
CHLORIDE SERPL-SCNC: 108 MMOL/L (ref 98–107)
CO2 SERPL-SCNC: 20 MMOL/L (ref 22–29)
COLLECT DURATION TIME UR: 24 HRS
COLLECT DURATION TIME UR: 24 HRS
CREAT SERPL-MCNC: 0.67 MG/DL (ref 0.57–1)
CREAT UR-MCNC: 61.6 MG/DL
CREATINE 24H UR-MRATE: 1.39 G/24 HR (ref 0.7–1.6)
DEPRECATED RDW RBC AUTO: 38.6 FL (ref 37–54)
EGFRCR SERPLBLD CKD-EPI 2021: 119.3 ML/MIN/1.73
EOSINOPHIL # BLD AUTO: 0.1 10*3/MM3 (ref 0–0.4)
EOSINOPHIL NFR BLD AUTO: 1.5 % (ref 0.3–6.2)
ERYTHROCYTE [DISTWIDTH] IN BLOOD BY AUTOMATED COUNT: 12.2 % (ref 12.3–15.4)
GLOBULIN UR ELPH-MCNC: 2.3 GM/DL
GLUCOSE SERPL-MCNC: 79 MG/DL (ref 65–99)
HCT VFR BLD AUTO: 32.2 % (ref 34–46.6)
HGB BLD-MCNC: 10.5 G/DL (ref 12–15.9)
IMM GRANULOCYTES # BLD AUTO: 0.06 10*3/MM3 (ref 0–0.05)
IMM GRANULOCYTES NFR BLD AUTO: 0.9 % (ref 0–0.5)
LYMPHOCYTES # BLD AUTO: 2.18 10*3/MM3 (ref 0.7–3.1)
LYMPHOCYTES NFR BLD AUTO: 32.3 % (ref 19.6–45.3)
MCH RBC QN AUTO: 28.5 PG (ref 26.6–33)
MCHC RBC AUTO-ENTMCNC: 32.6 G/DL (ref 31.5–35.7)
MCV RBC AUTO: 87.3 FL (ref 79–97)
MONOCYTES # BLD AUTO: 0.71 10*3/MM3 (ref 0.1–0.9)
MONOCYTES NFR BLD AUTO: 10.5 % (ref 5–12)
NEUTROPHILS NFR BLD AUTO: 3.65 10*3/MM3 (ref 1.7–7)
NEUTROPHILS NFR BLD AUTO: 54.2 % (ref 42.7–76)
NRBC BLD AUTO-RTO: 0 /100 WBC (ref 0–0.2)
PLATELET # BLD AUTO: 177 10*3/MM3 (ref 140–450)
PMV BLD AUTO: 10 FL (ref 6–12)
POTASSIUM SERPL-SCNC: 4.1 MMOL/L (ref 3.5–5.2)
PROT 24H UR-MRATE: 234 MG/24HOURS (ref 0–150)
PROT SERPL-MCNC: 5.5 G/DL (ref 6–8.5)
RBC # BLD AUTO: 3.69 10*6/MM3 (ref 3.77–5.28)
SODIUM SERPL-SCNC: 137 MMOL/L (ref 136–145)
SPECIMEN VOL 24H UR: 2250 ML
SPECIMEN VOL 24H UR: 2250 ML
WBC NRBC COR # BLD: 6.74 10*3/MM3 (ref 3.4–10.8)

## 2022-03-12 PROCEDURE — 81050 URINALYSIS VOLUME MEASURE: CPT | Performed by: OBSTETRICS & GYNECOLOGY

## 2022-03-12 PROCEDURE — 59025 FETAL NON-STRESS TEST: CPT

## 2022-03-12 PROCEDURE — 85025 COMPLETE CBC W/AUTO DIFF WBC: CPT | Performed by: OBSTETRICS & GYNECOLOGY

## 2022-03-12 PROCEDURE — 80053 COMPREHEN METABOLIC PANEL: CPT | Performed by: OBSTETRICS & GYNECOLOGY

## 2022-03-12 PROCEDURE — 82570 ASSAY OF URINE CREATININE: CPT | Performed by: OBSTETRICS & GYNECOLOGY

## 2022-03-12 PROCEDURE — G0378 HOSPITAL OBSERVATION PER HR: HCPCS

## 2022-03-12 PROCEDURE — 84156 ASSAY OF PROTEIN URINE: CPT | Performed by: OBSTETRICS & GYNECOLOGY

## 2022-03-12 PROCEDURE — 63710000001 INSULIN REGULAR HUMAN PER 5 UNITS: Performed by: OBSTETRICS & GYNECOLOGY

## 2022-03-12 PROCEDURE — 99217 PR OBSERVATION CARE DISCHARGE MANAGEMENT: CPT | Performed by: OBSTETRICS & GYNECOLOGY

## 2022-03-12 PROCEDURE — 59025 FETAL NON-STRESS TEST: CPT | Performed by: OBSTETRICS & GYNECOLOGY

## 2022-03-12 RX ADMIN — INSULIN HUMAN 5 UNITS: 100 INJECTION, SOLUTION PARENTERAL at 14:57

## 2022-03-12 RX ADMIN — POTASSIUM CHLORIDE 40 MEQ: 10 TABLET, EXTENDED RELEASE ORAL at 00:08

## 2022-03-12 RX ADMIN — ACETAMINOPHEN 1000 MG: 500 TABLET ORAL at 08:07

## 2022-03-12 RX ADMIN — FERROUS SULFATE TAB 325 MG (65 MG ELEMENTAL FE) 325 MG: 325 (65 FE) TAB at 08:38

## 2022-03-12 NOTE — NON STRESS TEST
Reason for test: Antepartum  Date of Test: 3/12/2022  Time frame of test:   RN NST Interpretation: Reactive    Tracie Aguilar, gaby  at 36w5d with an ESTUARDO of 2022, by Ultrasound, was seen at [facility] ANTEPARTUM UNIT for a nonstress test.    Chief Complaint   Patient presents with   • Headache     ANTEPARTUM - pt reports headache and proteinuria without severe BPs. Sent from office for pre-eclampsia workup. Reports some mild contractions. Denies leaking fluid, vaginal bleeding. Reports good fetal movement.       Patient Active Problem List   Diagnosis   • Previous  section   • GBS (group B Streptococcus carrier), +RV culture, currently pregnant   • Anxiety   • Back pain affecting pregnancy   • Mild intermittent asthma   • GDM, class A2   • Anemia of mother during pregnancy, delivered   • Pregnancy   • Elevated blood pressure reading   • Hypokalemia       Interpretation A  Nonstress Test Interpretation A: Reactive (22 : Taniya Wooten, RN)  Comments A: [id number] (22 : Taniya Wooten, IVAN)

## 2022-03-12 NOTE — DISCHARGE SUMMARY
Discharge summary    Date of admission 3/11/2022  Date of discharge 3/12/2022    Admitting diagnosis    Prior , GBS carrier, anxiety, gestational diabetes class A2, elevated blood pressure    Discharge diagnosis-same    Hospital course -patient was admitted yesterday for evaluation of elevated blood pressure found in clinic.  Fortunately blood pressures were in the normal range during hospitalization, physical exam was reassuring DTRs were normal she did not have any significant edema and had no headaches or abdominal pain.  Liver enzymes were normal, platelets were normal, Covid testing was negative, urinalysis on the ninth had showed negative protein.  Protein creatinine ratio on the ninth was 240.  Patient is requesting discharge today.  We are awaiting her 24-hour urine.  If that is reassuring she will be allowed to go home and stay at decrease activities will call the office on Monday and potentially move towards delivery during her 37th week of gestation.  Warning signs of PIH were again reviewed.  Patient voiced understanding.    Condition at discharge-stable  Patient to return to taking her normal medicines and activity management as outlined above.

## 2022-03-12 NOTE — NURSING NOTE
"Physical Therapy  Treatment    Jigar Bhatt Sr.   MRN: [id number]   [facility] [place]    Assessment:  Jigar Bhatt Sr. is a 78 y.o. male with a medical diagnosis of Mastoiditis of left side. Pt tolerated well, although pt was close SBA, overall instability throughout functional mobility, pt would continue to benefit from skilled PT services to improve overall functional mobility, strength and endurance.  .    Rehab identified problem list/impairments: impaired endurance, weakness, impaired self care skills, impaired functional mobilty, gait instability, impaired balance, decreased lower extremity function, decreased safety awareness, impaired coordination, impaired fine motor, impaired cognition, decreased " Pt reports taking BG with home monitor. Reports blood glucose of 84 @ 1030.    Marypete Ricejoshua, PTA  2019

## 2022-03-12 NOTE — PLAN OF CARE
Problem: Adult Inpatient Plan of Care  Goal: Patient-Specific Goal (Individualized)  Outcome: Ongoing, Progressing  Flowsheets (Taken 3/12/2022 9735)  Patient-Specific Goals (Include Timeframe): keep patient updated on POC throughout shift.  Individualized Care Needs: education on POC and thorough explanation that is easily understandable for any procedure.  Anxieties, Fears or Concerns: different than last pregnancy, irregular possible Pre-E symptoms, keep pt involved with POC.   Goal Outcome Evaluation:

## 2022-03-12 NOTE — NON STRESS TEST
Tracie Lauren, a  at 36w4d with an ETSUARDO of 2022, by Ultrasound, was seen at [facility] ANTEPARTUM UNIT for a nonstress test.    Chief Complaint   Patient presents with   • Headache     ANTEPARTUM - pt reports headache and proteinuria without severe BPs. Sent from office for pre-eclampsia workup. Reports some mild contractions. Denies leaking fluid, vaginal bleeding. Reports good fetal movement.       Patient Active Problem List   Diagnosis   • Previous  section   • GBS (group B Streptococcus carrier), +RV culture, currently pregnant   • Anxiety   • Back pain affecting pregnancy   • Mild intermittent asthma   • GDM, class A2   • Anemia of mother during pregnancy, delivered   • Pregnancy   • Elevated blood pressure reading   • Hypokalemia       Start Time:   Stop Time:            reactive

## 2022-03-17 ENCOUNTER — ROUTINE PRENATAL (OUTPATIENT)
Dept: OBSTETRICS AND GYNECOLOGY | Age: 32
End: 2022-03-17

## 2022-03-17 VITALS — DIASTOLIC BLOOD PRESSURE: 80 MMHG | BODY MASS INDEX: 36.14 KG/M2 | SYSTOLIC BLOOD PRESSURE: 124 MMHG | WEIGHT: 204 LBS

## 2022-03-17 DIAGNOSIS — O99.820 GBS (GROUP B STREPTOCOCCUS CARRIER), +RV CULTURE, CURRENTLY PREGNANT: ICD-10-CM

## 2022-03-17 DIAGNOSIS — Z34.90 PREGNANCY, UNSPECIFIED GESTATIONAL AGE: ICD-10-CM

## 2022-03-17 DIAGNOSIS — O24.419 GDM, CLASS A2: ICD-10-CM

## 2022-03-17 DIAGNOSIS — Z98.891 PREVIOUS CESAREAN SECTION: ICD-10-CM

## 2022-03-17 DIAGNOSIS — Z13.89 SCREENING FOR BLOOD OR PROTEIN IN URINE: Primary | ICD-10-CM

## 2022-03-17 LAB
GLUCOSE UR STRIP-MCNC: NEGATIVE MG/DL
PROT UR STRIP-MCNC: NEGATIVE MG/DL

## 2022-03-17 PROCEDURE — 0502F SUBSEQUENT PRENATAL CARE: CPT | Performed by: OBSTETRICS & GYNECOLOGY

## 2022-03-17 NOTE — PROGRESS NOTES
Patient was discharged from the hospital pressures were good and 24-hour urine was less than 300 mg of protein.  Patient reports she is feeling much better than last week.  Baby is moving well.  Blood sugars have been normal except for one fasting of 98.  Patient is taking 5 units of lispro insulin with lunch and dinner.  She is having some irregular contractions.    Pressure is 124/80 with no protein  Ultrasound today shows estimated fetal weight of 7 pounds 2 ounces at the 50th percentile.  Abdominal circumference is at the 65th percentile  Cervix is closed thick and anterior.  Biophysical profile is 8 out of 8.  ANITA is 18    Assessment-37 weeks 3 days  Blood pressures improved and no proteinuria today.  Reviewed kick counts.  Patient will call with signs or symptoms of preeclampsia.  Gestational diabetes mellitus A2-continue 5 units of lispro with lunch and dinner.  Continue weekly BPP's and kick counts.  Fetal weight is normal today  Prior -patient is scheduled for repeat  at 39 weeks  Anemia-improved with hemoglobin of 11.6  Asthma-continue inhalers  History of postop abscess due to GBS.  Plan for Ancef at time of .  We will also give 1 additional dose postop.

## 2022-03-23 ENCOUNTER — TELEPHONE (OUTPATIENT)
Dept: OBSTETRICS AND GYNECOLOGY | Age: 32
End: 2022-03-23

## 2022-03-23 ENCOUNTER — HOSPITAL ENCOUNTER (EMERGENCY)
Facility: HOSPITAL | Age: 32
Discharge: HOME | End: 2022-03-23
Attending: OBSTETRICS & GYNECOLOGY | Admitting: OBSTETRICS & GYNECOLOGY

## 2022-03-23 VITALS
OXYGEN SATURATION: 100 % | SYSTOLIC BLOOD PRESSURE: 111 MMHG | DIASTOLIC BLOOD PRESSURE: 56 MMHG | TEMPERATURE: 98.2 F | HEART RATE: 62 BPM | RESPIRATION RATE: 16 BRPM

## 2022-03-23 DIAGNOSIS — O26.893 HEADACHE IN PREGNANCY, ANTEPARTUM, THIRD TRIMESTER: Primary | ICD-10-CM

## 2022-03-23 DIAGNOSIS — R51.9 HEADACHE IN PREGNANCY, ANTEPARTUM, THIRD TRIMESTER: Primary | ICD-10-CM

## 2022-03-23 LAB
ALBUMIN SERPL-MCNC: 3.5 G/DL (ref 3.5–5.2)
ALBUMIN/GLOB SERPL: 1.5 G/DL
ALP SERPL-CCNC: 106 U/L (ref 39–117)
ALT SERPL W P-5'-P-CCNC: 7 U/L (ref 1–33)
ANION GAP SERPL CALCULATED.3IONS-SCNC: 12 MMOL/L (ref 5–15)
AST SERPL-CCNC: 12 U/L (ref 1–32)
BACTERIA UR QL AUTO: ABNORMAL /HPF
BASOPHILS # BLD AUTO: 0.03 10*3/MM3 (ref 0–0.2)
BASOPHILS NFR BLD AUTO: 0.4 % (ref 0–1.5)
BILIRUB SERPL-MCNC: <0.2 MG/DL (ref 0–1.2)
BILIRUB UR QL STRIP: NEGATIVE
BUN SERPL-MCNC: 13 MG/DL (ref 6–20)
BUN/CREAT SERPL: 16.9 (ref 7–25)
CALCIUM SPEC-SCNC: 8.4 MG/DL (ref 8.6–10.5)
CHLORIDE SERPL-SCNC: 106 MMOL/L (ref 98–107)
CLARITY UR: ABNORMAL
CO2 SERPL-SCNC: 19 MMOL/L (ref 22–29)
COLOR UR: YELLOW
CREAT SERPL-MCNC: 0.77 MG/DL (ref 0.57–1)
CREAT UR-MCNC: 100.6 MG/DL
DEPRECATED RDW RBC AUTO: 39.2 FL (ref 37–54)
EGFRCR SERPLBLD CKD-EPI 2021: 105.3 ML/MIN/1.73
EOSINOPHIL # BLD AUTO: 0.06 10*3/MM3 (ref 0–0.4)
EOSINOPHIL NFR BLD AUTO: 0.8 % (ref 0.3–6.2)
ERYTHROCYTE [DISTWIDTH] IN BLOOD BY AUTOMATED COUNT: 12.7 % (ref 12.3–15.4)
GLOBULIN UR ELPH-MCNC: 2.4 GM/DL
GLUCOSE SERPL-MCNC: 74 MG/DL (ref 65–99)
GLUCOSE UR STRIP-MCNC: NEGATIVE MG/DL
HCT VFR BLD AUTO: 32.3 % (ref 34–46.6)
HGB BLD-MCNC: 11 G/DL (ref 12–15.9)
HGB UR QL STRIP.AUTO: ABNORMAL
HYALINE CASTS UR QL AUTO: ABNORMAL /LPF
IMM GRANULOCYTES # BLD AUTO: 0.04 10*3/MM3 (ref 0–0.05)
IMM GRANULOCYTES NFR BLD AUTO: 0.5 % (ref 0–0.5)
KETONES UR QL STRIP: NEGATIVE
LEUKOCYTE ESTERASE UR QL STRIP.AUTO: ABNORMAL
LYMPHOCYTES # BLD AUTO: 1.85 10*3/MM3 (ref 0.7–3.1)
LYMPHOCYTES NFR BLD AUTO: 25.4 % (ref 19.6–45.3)
MCH RBC QN AUTO: 29.3 PG (ref 26.6–33)
MCHC RBC AUTO-ENTMCNC: 34.1 G/DL (ref 31.5–35.7)
MCV RBC AUTO: 86.1 FL (ref 79–97)
MONOCYTES # BLD AUTO: 0.72 10*3/MM3 (ref 0.1–0.9)
MONOCYTES NFR BLD AUTO: 9.9 % (ref 5–12)
NEUTROPHILS NFR BLD AUTO: 4.58 10*3/MM3 (ref 1.7–7)
NEUTROPHILS NFR BLD AUTO: 63 % (ref 42.7–76)
NITRITE UR QL STRIP: NEGATIVE
NRBC BLD AUTO-RTO: 0 /100 WBC (ref 0–0.2)
PH UR STRIP.AUTO: 6.5 [PH] (ref 5–8)
PLATELET # BLD AUTO: 198 10*3/MM3 (ref 140–450)
PMV BLD AUTO: 10.2 FL (ref 6–12)
POTASSIUM SERPL-SCNC: 3.7 MMOL/L (ref 3.5–5.2)
PROT ?TM UR-MCNC: 22.4 MG/DL
PROT SERPL-MCNC: 5.9 G/DL (ref 6–8.5)
PROT UR QL STRIP: ABNORMAL
PROT/CREAT UR: 222.7 MG/G CREA (ref 0–200)
RBC # BLD AUTO: 3.75 10*6/MM3 (ref 3.77–5.28)
RBC # UR STRIP: ABNORMAL /HPF
REF LAB TEST METHOD: ABNORMAL
SODIUM SERPL-SCNC: 137 MMOL/L (ref 136–145)
SP GR UR STRIP: 1.02 (ref 1–1.03)
SQUAMOUS #/AREA URNS HPF: ABNORMAL /HPF
UROBILINOGEN UR QL STRIP: ABNORMAL
WBC # UR STRIP: ABNORMAL /HPF
WBC NRBC COR # BLD: 7.28 10*3/MM3 (ref 3.4–10.8)

## 2022-03-23 PROCEDURE — 80053 COMPREHEN METABOLIC PANEL: CPT | Performed by: OBSTETRICS & GYNECOLOGY

## 2022-03-23 PROCEDURE — 82570 ASSAY OF URINE CREATININE: CPT | Performed by: OBSTETRICS & GYNECOLOGY

## 2022-03-23 PROCEDURE — 81001 URINALYSIS AUTO W/SCOPE: CPT | Performed by: OBSTETRICS & GYNECOLOGY

## 2022-03-23 PROCEDURE — 59025 FETAL NON-STRESS TEST: CPT

## 2022-03-23 PROCEDURE — 99284 EMERGENCY DEPT VISIT MOD MDM: CPT | Performed by: OBSTETRICS & GYNECOLOGY

## 2022-03-23 PROCEDURE — 63710000001 ONDANSETRON PER 8 MG: Performed by: OBSTETRICS & GYNECOLOGY

## 2022-03-23 PROCEDURE — 85025 COMPLETE CBC W/AUTO DIFF WBC: CPT | Performed by: OBSTETRICS & GYNECOLOGY

## 2022-03-23 PROCEDURE — 87086 URINE CULTURE/COLONY COUNT: CPT | Performed by: OBSTETRICS & GYNECOLOGY

## 2022-03-23 PROCEDURE — 84156 ASSAY OF PROTEIN URINE: CPT | Performed by: OBSTETRICS & GYNECOLOGY

## 2022-03-23 RX ORDER — ONDANSETRON 4 MG/1
4 TABLET, FILM COATED ORAL ONCE
Status: COMPLETED | OUTPATIENT
Start: 2022-03-23 | End: 2022-03-23

## 2022-03-23 RX ORDER — ONDANSETRON 4 MG/1
4 TABLET, ORALLY DISINTEGRATING ORAL EVERY 6 HOURS PRN
Qty: 15 TABLET | Refills: 0 | Status: SHIPPED | OUTPATIENT
Start: 2022-03-23 | End: 2022-04-15

## 2022-03-23 RX ORDER — ACETAMINOPHEN 325 MG/1
650 TABLET ORAL EVERY 6 HOURS PRN
COMMUNITY
End: 2022-05-09

## 2022-03-23 RX ORDER — BUTALBITAL, ACETAMINOPHEN AND CAFFEINE 300; 40; 50 MG/1; MG/1; MG/1
1 CAPSULE ORAL EVERY 4 HOURS PRN
Qty: 20 CAPSULE | Refills: 0 | Status: SHIPPED | OUTPATIENT
Start: 2022-03-23 | End: 2022-03-27 | Stop reason: HOSPADM

## 2022-03-23 RX ORDER — BUTALBITAL, ACETAMINOPHEN AND CAFFEINE 50; 325; 40 MG/1; MG/1; MG/1
2 TABLET ORAL EVERY 4 HOURS PRN
Status: DISCONTINUED | OUTPATIENT
Start: 2022-03-23 | End: 2022-03-23 | Stop reason: HOSPADM

## 2022-03-23 RX ADMIN — ONDANSETRON HYDROCHLORIDE 4 MG: 4 TABLET, FILM COATED ORAL at 18:54

## 2022-03-23 RX ADMIN — BUTALBITAL, ACETAMINOPHEN, AND CAFFEINE 2 TABLET: 50; 325; 40 TABLET ORAL at 18:54

## 2022-03-23 NOTE — TELEPHONE ENCOUNTER
Spoke with patient, relayed directions. Patient stated understanding, stated that she would head that way about 6. No other questions at this time.    Called L&D, talked to Suzanne. Let her know when the patient would be coming, that she just needed to be monitored.

## 2022-03-23 NOTE — TELEPHONE ENCOUNTER
Patient 38w2d, called C/O blurred vision, increase in headache, /65, HR 89. States Normal BP is around 106/62 - 118/70. States that she had this issue last week. Patient states that she was evaluated for Preeclampsia. Results were negative. No other complaints and fetal movement is slightly less today than normal. Patient has appointment with Provider tomorrow w/ US.

## 2022-03-23 NOTE — OBED NOTES
RAZIA Note OB        Patient Name: Tracie Aguilar  YOB: 1990  MRN: [id number]  Admission Date: 3/23/2022  5:44 PM  Date of Service: 3/23/2022    Chief Complaint: Vision Disturbance (RAZIA-Pt reports losing her vision at approximately 1400 today.  Describes as really blurry and not able to focus, followed by a HA.  Vision returned an hour later and HA resolved with Tylenol.  Pt was worked up for preeclampsia when same thing occurred 2 weeks ago.)        Subjective     Tracie Aguilar is a 31 y.o. female  at 38w2d with Estimated Date of Delivery: 22 who presents with the chief complaint listed above.  She sees Chris Fisher MD for her prenatal care. Her pregnancy has been complicated by:  prior , GDMA2 on insulin, anemia, asthma, GBS carrier, anxiety, recent HTN.    Patient with recent episode of elevated Bps and visual disturbance last week.  She was admitted and underwent work-up with 24 hour urine protein, which was normal.  She has since had normal Bps.  She is here today complaining of headache.  She reports onset of blurry vision where she wasn't able to focus.  Headache then ensued and blurriness resolved.  She has had normal blood pressures and denies RUQ/epigastric pain or shortness of breath.        She describes fetal movement as normal.  She denies rupture of membranes.  She denies vaginal bleeding. She is not feeling contractions.          Objective   Patient Active Problem List    Diagnosis    • *Previous  section [Z98.891]    • Hypokalemia [E87.6]    • Pregnancy [Z34.90]    • Elevated blood pressure reading [R03.0]    • GDM, class A2 [O24.419]    • Anemia of mother during pregnancy, delivered [O99.02]    • Mild intermittent asthma [J45.20]    • Back pain affecting pregnancy [O99.891, M54.9]    • GBS (group B Streptococcus carrier), +RV culture, currently pregnant [O99.820]    • Anxiety [F41.9]         OB History    Para Term  AB Living  "  2 1 1 0 0 1   SAB IAB Ectopic Molar Multiple Live Births   0 0 0 0 0 1      # Outcome Date GA Lbr Smith/2nd Weight Sex Delivery Anes PTL Lv   2 Current            1 Term 19 40w6d  3415 g (7 lb 8.5 oz) F CS-LTranv EPI N SUN      Complications: Fetal Intolerance      Name: CHANDA ANTUNEZ      Apgar1: 4  Apgar5: 9        Past Medical History:   Diagnosis Date   • Anxiety    • Asthma    • Gestational diabetes    • IBS (irritable bowel syndrome)        Past Surgical History:   Procedure Laterality Date   • ABSCESS DRAINAGE      right side   •  SECTION         No current facility-administered medications on file prior to encounter.     Current Outpatient Medications on File Prior to Encounter   Medication Sig Dispense Refill   • ferrous gluconate 324 (37.5 Fe) MG tablet tablet Take  by mouth Daily With Breakfast.     • insulin regular (humuLIN R,novoLIN R) 100 UNIT/ML injection 5 units with lunch and dinner 200 mL 1   • prenatal vitamin (prenatal, CLASSIC, vitamin) tablet Take  by mouth Daily.     • acetaminophen (TYLENOL) 325 MG tablet Take 650 mg by mouth Every 6 (Six) Hours As Needed for Mild Pain .     • albuterol sulfate  (90 Base) MCG/ACT inhaler Inhale 2 puffs Every 4 (Four) Hours As Needed.     • B-D INS SYR ULTRAFINE 1CC/30G 30G X 1/2\" 1 ML misc USE TO INJECT INSUILIN TWICE A DAY     • budesonide (PULMICORT) 90 MCG/ACT inhaler Inhale 1 puff 2 (Two) Times a Day. 1 each 11   • Influenza Vac Split Quad 0.5 ML suspension injection Inject 0.5 mL into the appropriate muscle as directed by prescriber 1 (One) Time.     • OneTouch Delica Lancets 33G misc      • OneTouch Verio test strip      • [DISCONTINUED] NIFEdipine (PROCARDIA) 20 MG capsule Take 1 capsule by mouth Every 6 (Six) Hours As Needed (discomfort with contractions). 10 capsule 0       Allergies   Allergen Reactions   • Augmentin [Amoxicillin-Pot Clavulanate] Nausea And Vomiting   • Sulfa Antibiotics Rash       Family History "   Problem Relation Age of Onset   • Breast cancer Maternal Grandmother 55   • Diabetes Maternal Grandmother    • Lung cancer Paternal Grandfather 60   • Diabetes Mother        Social History     Socioeconomic History   • Marital status:      Spouse name: Aurelio   Tobacco Use   • Smoking status: Never Smoker   • Smokeless tobacco: Never Used   Vaping Use   • Vaping Use: Never used   Substance and Sexual Activity   • Alcohol use: Not Currently   • Drug use: Never   • Sexual activity: Yes     Partners: Male     Birth control/protection: None           Review of Systems   Constitutional: Negative for chills, fatigue and fever.   HENT: Negative for congestion, rhinorrhea and sore throat.    Eyes: Negative for visual disturbance.   Respiratory: Negative.    Cardiovascular: Negative.    Gastrointestinal: Positive for nausea. Negative for abdominal pain, constipation, diarrhea and vomiting.   Genitourinary: Negative for difficulty urinating, dyspareunia, dysuria, flank pain, frequency, genital sores, hematuria, pelvic pain, urgency, vaginal bleeding, vaginal discharge and vaginal pain.   Neurological: Positive for headaches. Negative for dizziness, seizures and light-headedness.   Psychiatric/Behavioral: Negative for sleep disturbance. The patient is not nervous/anxious.           PHYSICAL EXAM:      VITAL SIGNS:  Vitals:    03/23/22 1808 03/23/22 1815 03/23/22 1845 03/23/22 1900   BP: 99/46 99/56 110/53 111/56   BP Location: Right arm      Patient Position: Sitting      Pulse: 64 63 64 62   Resp: 16      Temp: 98.2 °F (36.8 °C)      TempSrc: Oral      SpO2: 100%           FHT'S:                   Baseline:  120 BPM  Variability:  Moderate = 6 - 25 BPM  Accelerations:  15 x 15 accelerations present     Decelerations:  absent  Contractions:   absent     Interpretation:    Reactive NST, CAT 1 tracing        PHYSICAL EXAM:    General: well developed; well nourished  no acute distress   Heart: Not performed.   Lungs  :  breathing is unlabored     Abdomen: soft, non-tender; no masses  no umbilical or inguinal hernias are present  no hepato-splenomegaly       Cervix: was not checked.      Contractions: none        Extremities: peripheral pulses normal, no pedal edema, no clubbing or cyanosis      LABS AND TESTING ORDERED:  1. Uterine and fetal monitoring  2. Urinalysis  3. CBC, CMP, urine p/c    LAB RESULTS:    Recent Results (from the past 24 hour(s))   Urinalysis With Culture If Indicated - Urine, Clean Catch    Collection Time: 03/23/22  6:32 PM    Specimen: Urine, Clean Catch   Result Value Ref Range    Color, UA Yellow Yellow, Straw    Appearance, UA Cloudy (A) Clear    pH, UA 6.5 5.0 - 8.0    Specific Gravity, UA 1.019 1.005 - 1.030    Glucose, UA Negative Negative    Ketones, UA Negative Negative    Bilirubin, UA Negative Negative    Blood, UA Moderate (2+) (A) Negative    Protein, UA Trace (A) Negative    Leuk Esterase, UA Moderate (2+) (A) Negative    Nitrite, UA Negative Negative    Urobilinogen, UA 0.2 E.U./dL 0.2 - 1.0 E.U./dL   Comprehensive Metabolic Panel    Collection Time: 03/23/22  6:32 PM    Specimen: Blood   Result Value Ref Range    Glucose 74 65 - 99 mg/dL    BUN 13 6 - 20 mg/dL    Creatinine 0.77 0.57 - 1.00 mg/dL    Sodium 137 136 - 145 mmol/L    Potassium 3.7 3.5 - 5.2 mmol/L    Chloride 106 98 - 107 mmol/L    CO2 19.0 (L) 22.0 - 29.0 mmol/L    Calcium 8.4 (L) 8.6 - 10.5 mg/dL    Total Protein 5.9 (L) 6.0 - 8.5 g/dL    Albumin 3.50 3.50 - 5.20 g/dL    ALT (SGPT) 7 1 - 33 U/L    AST (SGOT) 12 1 - 32 U/L    Alkaline Phosphatase 106 39 - 117 U/L    Total Bilirubin <0.2 0.0 - 1.2 mg/dL    Globulin 2.4 gm/dL    A/G Ratio 1.5 g/dL    BUN/Creatinine Ratio 16.9 7.0 - 25.0    Anion Gap 12.0 5.0 - 15.0 mmol/L    eGFR 105.3 >60.0 mL/min/1.73   Protein / Creatinine Ratio, Urine - Urine, Clean Catch    Collection Time: 03/23/22  6:32 PM    Specimen: Urine, Clean Catch   Result Value Ref Range    Protein/Creatinine Ratio,  Urine 222.7 (H) 0.0 - 200.0 mg/G Crea    Creatinine, Urine 100.6 mg/dL    Total Protein, Urine 22.4 mg/dL   CBC Auto Differential    Collection Time: 22  6:32 PM    Specimen: Blood   Result Value Ref Range    WBC 7.28 3.40 - 10.80 10*3/mm3    RBC 3.75 (L) 3.77 - 5.28 10*6/mm3    Hemoglobin 11.0 (L) 12.0 - 15.9 g/dL    Hematocrit 32.3 (L) 34.0 - 46.6 %    MCV 86.1 79.0 - 97.0 fL    MCH 29.3 26.6 - 33.0 pg    MCHC 34.1 31.5 - 35.7 g/dL    RDW 12.7 12.3 - 15.4 %    RDW-SD 39.2 37.0 - 54.0 fl    MPV 10.2 6.0 - 12.0 fL    Platelets 198 140 - 450 10*3/mm3    Neutrophil % 63.0 42.7 - 76.0 %    Lymphocyte % 25.4 19.6 - 45.3 %    Monocyte % 9.9 5.0 - 12.0 %    Eosinophil % 0.8 0.3 - 6.2 %    Basophil % 0.4 0.0 - 1.5 %    Immature Grans % 0.5 0.0 - 0.5 %    Neutrophils, Absolute 4.58 1.70 - 7.00 10*3/mm3    Lymphocytes, Absolute 1.85 0.70 - 3.10 10*3/mm3    Monocytes, Absolute 0.72 0.10 - 0.90 10*3/mm3    Eosinophils, Absolute 0.06 0.00 - 0.40 10*3/mm3    Basophils, Absolute 0.03 0.00 - 0.20 10*3/mm3    Immature Grans, Absolute 0.04 0.00 - 0.05 10*3/mm3    nRBC 0.0 0.0 - 0.2 /100 WBC   Urinalysis, Microscopic Only - Urine, Clean Catch    Collection Time: 22  6:32 PM    Specimen: Urine, Clean Catch   Result Value Ref Range    RBC, UA 21-30 (A) None Seen, 0-2 /HPF    WBC, UA 31-50 (A) None Seen, 0-2 /HPF    Bacteria, UA 2+ (A) None Seen /HPF    Squamous Epithelial Cells, UA 31-50 (A) None Seen, 0-2 /HPF    Hyaline Casts, UA 7-12 None Seen /LPF    Methodology Automated Microscopy        Lab Results   Component Value Date    ABO O 2021    RH Positive 2021       No results found for: STREPGPB              Assessment/Plan     ASSESSMENT/PLAN:  Tracie Aguilar is a 31 y.o. female  at 38w2d who presented with: blurry vision accompanied by headache.  Patient with symptoms of classic migraine with aura.  Visual disturbance resolved when headache occurred.  She also had nausea which fits with migraine  diagnosis.  She has normal blood pressures.  Preeclampsia labs sent and normal.  Headache and nausea improved with treatment.  She was discharged home with return precautions reviewed.          Final Impression:  • Pregnancy at 38w2d  • Reactive NST.  CAT 1 tracing  • Migraine with aura in pregnancy without evidence of pre-eclampsia  • Maternal vital signs were reviewed and were unremarkable              Vitals:    03/23/22 1808 03/23/22 1815 03/23/22 1845 03/23/22 1900   BP: 99/46 99/56 110/53 111/56   BP Location: Right arm      Patient Position: Sitting      Pulse: 64 63 64 62   Resp: 16      Temp: 98.2 °F (36.8 °C)      TempSrc: Oral      SpO2: 100%      •     • No results found for: STREPGPB  Lab Results   Component Value Date    ABO O 09/02/2021    RH Positive 09/02/2021   •   • COVID - 19 status unknown      PLAN:       I have spent 30 minutes including face to face time with the patient, greater than 50% in discussion of the diagnosis (counseling) and/or coordination of care.     Elena Boyce MD  3/23/2022  19:15 EDT  OB Hospitalist  Phone:  [phone]

## 2022-03-23 NOTE — TELEPHONE ENCOUNTER
My apologies for the delay in my response. It is 4 o'clock now, I would suggest a w/u in L&D and can keep appt for tomorrow as well

## 2022-03-24 ENCOUNTER — ROUTINE PRENATAL (OUTPATIENT)
Dept: OBSTETRICS AND GYNECOLOGY | Age: 32
End: 2022-03-24

## 2022-03-24 VITALS — SYSTOLIC BLOOD PRESSURE: 108 MMHG | BODY MASS INDEX: 36.49 KG/M2 | DIASTOLIC BLOOD PRESSURE: 74 MMHG | WEIGHT: 206 LBS

## 2022-03-24 DIAGNOSIS — Z34.90 PREGNANCY, UNSPECIFIED GESTATIONAL AGE: ICD-10-CM

## 2022-03-24 DIAGNOSIS — Z98.891 PREVIOUS CESAREAN SECTION: ICD-10-CM

## 2022-03-24 DIAGNOSIS — O24.419 GDM (GESTATIONAL DIABETES MELLITUS): ICD-10-CM

## 2022-03-24 DIAGNOSIS — O24.419 GDM, CLASS A2: ICD-10-CM

## 2022-03-24 DIAGNOSIS — Z13.89 SCREENING FOR BLOOD OR PROTEIN IN URINE: Primary | ICD-10-CM

## 2022-03-24 LAB
BACTERIA SPEC AEROBE CULT: NO GROWTH
GLUCOSE UR STRIP-MCNC: NEGATIVE MG/DL
PROT UR STRIP-MCNC: NEGATIVE MG/DL

## 2022-03-24 PROCEDURE — 0502F SUBSEQUENT PRENATAL CARE: CPT | Performed by: OBSTETRICS & GYNECOLOGY

## 2022-03-24 PROCEDURE — 76819 FETAL BIOPHYS PROFIL W/O NST: CPT | Performed by: OBSTETRICS & GYNECOLOGY

## 2022-03-24 NOTE — PROGRESS NOTES
The patient went to the hospital yesterday with headache and visual change.  Her protein was elevated.  Blood pressures were normal.  Patient is still having mild headache.  Baby is moving well.    Biophysical profile is 8 out of 8.  ANITA is 11.  Baby is now in transverse position.    Blood pressure 108/74 with no protein.    Assessment-38 weeks 3 days  Gestational hypertension with blood pressures that have been up and down.  Patient does have proteinuria when she was seen at the hospital last night and has symptoms on and off.  Recommend moving her  up to tomorrow.  Gestational diabetes mellitus A2-continue 5 units of lispro with lunch and dinner.  BPP is reassuring.  Continue kick counts  Anemia-continue iron  History of postop abscess due to GBS.

## 2022-03-25 ENCOUNTER — ANESTHESIA EVENT (OUTPATIENT)
Dept: LABOR AND DELIVERY | Facility: HOSPITAL | Age: 32
End: 2022-03-25

## 2022-03-25 ENCOUNTER — ANESTHESIA (OUTPATIENT)
Dept: LABOR AND DELIVERY | Facility: HOSPITAL | Age: 32
End: 2022-03-25

## 2022-03-25 ENCOUNTER — HOSPITAL ENCOUNTER (INPATIENT)
Facility: HOSPITAL | Age: 32
LOS: 2 days | Discharge: HOME | End: 2022-03-27
Attending: OBSTETRICS & GYNECOLOGY | Admitting: OBSTETRICS & GYNECOLOGY

## 2022-03-25 DIAGNOSIS — Z98.891 PREVIOUS CESAREAN SECTION: ICD-10-CM

## 2022-03-25 PROBLEM — R51.9 HEADACHE: Status: ACTIVE | Noted: 2022-03-25

## 2022-03-25 LAB
ABO GROUP BLD: NORMAL
ALBUMIN SERPL-MCNC: 3.5 G/DL (ref 3.5–5.2)
ALBUMIN/GLOB SERPL: 1.5 G/DL
ALP SERPL-CCNC: 110 U/L (ref 39–117)
ALT SERPL W P-5'-P-CCNC: 7 U/L (ref 1–33)
ANION GAP SERPL CALCULATED.3IONS-SCNC: 12.9 MMOL/L (ref 5–15)
AST SERPL-CCNC: 11 U/L (ref 1–32)
BILIRUB SERPL-MCNC: 0.2 MG/DL (ref 0–1.2)
BLD GP AB SCN SERPL QL: NEGATIVE
BUN SERPL-MCNC: 13 MG/DL (ref 6–20)
BUN/CREAT SERPL: 17.1 (ref 7–25)
CALCIUM SPEC-SCNC: 8.7 MG/DL (ref 8.6–10.5)
CHLORIDE SERPL-SCNC: 110 MMOL/L (ref 98–107)
CO2 SERPL-SCNC: 19.1 MMOL/L (ref 22–29)
CREAT SERPL-MCNC: 0.76 MG/DL (ref 0.57–1)
DEPRECATED RDW RBC AUTO: 42 FL (ref 37–54)
EGFRCR SERPLBLD CKD-EPI 2021: 106.9 ML/MIN/1.73
ERYTHROCYTE [DISTWIDTH] IN BLOOD BY AUTOMATED COUNT: 12.7 % (ref 12.3–15.4)
GLOBULIN UR ELPH-MCNC: 2.4 GM/DL
GLUCOSE SERPL-MCNC: 71 MG/DL (ref 65–99)
HCT VFR BLD AUTO: 33.3 % (ref 34–46.6)
HGB BLD-MCNC: 10.9 G/DL (ref 12–15.9)
MCH RBC QN AUTO: 29.1 PG (ref 26.6–33)
MCHC RBC AUTO-ENTMCNC: 32.7 G/DL (ref 31.5–35.7)
MCV RBC AUTO: 89 FL (ref 79–97)
PLATELET # BLD AUTO: 186 10*3/MM3 (ref 140–450)
PMV BLD AUTO: 10.9 FL (ref 6–12)
POTASSIUM SERPL-SCNC: 4.3 MMOL/L (ref 3.5–5.2)
PROT SERPL-MCNC: 5.9 G/DL (ref 6–8.5)
RBC # BLD AUTO: 3.74 10*6/MM3 (ref 3.77–5.28)
RH BLD: POSITIVE
SARS-COV-2 RNA PNL SPEC NAA+PROBE: NOT DETECTED
SODIUM SERPL-SCNC: 142 MMOL/L (ref 136–145)
T&S EXPIRATION DATE: NORMAL
WBC NRBC COR # BLD: 8.03 10*3/MM3 (ref 3.4–10.8)

## 2022-03-25 PROCEDURE — S0260 H&P FOR SURGERY: HCPCS | Performed by: OBSTETRICS & GYNECOLOGY

## 2022-03-25 PROCEDURE — 86901 BLOOD TYPING SEROLOGIC RH(D): CPT | Performed by: OBSTETRICS & GYNECOLOGY

## 2022-03-25 PROCEDURE — 25010000002 HYDROMORPHONE PER 4 MG: Performed by: ANESTHESIOLOGY

## 2022-03-25 PROCEDURE — 25010000002 CEFAZOLIN IN DEXTROSE 2-4 GM/100ML-% SOLUTION: Performed by: OBSTETRICS & GYNECOLOGY

## 2022-03-25 PROCEDURE — 86900 BLOOD TYPING SEROLOGIC ABO: CPT | Performed by: OBSTETRICS & GYNECOLOGY

## 2022-03-25 PROCEDURE — 25010000002 MORPHINE PER 10 MG: Performed by: ANESTHESIOLOGY

## 2022-03-25 PROCEDURE — 86850 RBC ANTIBODY SCREEN: CPT | Performed by: OBSTETRICS & GYNECOLOGY

## 2022-03-25 PROCEDURE — 80053 COMPREHEN METABOLIC PANEL: CPT | Performed by: OBSTETRICS & GYNECOLOGY

## 2022-03-25 PROCEDURE — 25010000002 ONDANSETRON PER 1 MG: Performed by: ANESTHESIOLOGY

## 2022-03-25 PROCEDURE — 85027 COMPLETE CBC AUTOMATED: CPT | Performed by: OBSTETRICS & GYNECOLOGY

## 2022-03-25 PROCEDURE — 59510 CESAREAN DELIVERY: CPT | Performed by: OBSTETRICS & GYNECOLOGY

## 2022-03-25 PROCEDURE — 87635 SARS-COV-2 COVID-19 AMP PRB: CPT | Performed by: OBSTETRICS & GYNECOLOGY

## 2022-03-25 PROCEDURE — 25010000002 PHENYLEPHRINE 10 MG/ML SOLUTION: Performed by: ANESTHESIOLOGY

## 2022-03-25 PROCEDURE — 25010000002 FENTANYL CITRATE (PF) 50 MCG/ML SOLUTION: Performed by: ANESTHESIOLOGY

## 2022-03-25 PROCEDURE — 88307 TISSUE EXAM BY PATHOLOGIST: CPT

## 2022-03-25 RX ORDER — DOCUSATE SODIUM 100 MG/1
100 CAPSULE, LIQUID FILLED ORAL 2 TIMES DAILY
Status: DISCONTINUED | OUTPATIENT
Start: 2022-03-25 | End: 2022-03-27 | Stop reason: HOSPADM

## 2022-03-25 RX ORDER — HYDROMORPHONE HYDROCHLORIDE 1 MG/ML
0.5 INJECTION, SOLUTION INTRAMUSCULAR; INTRAVENOUS; SUBCUTANEOUS
Status: DISCONTINUED | OUTPATIENT
Start: 2022-03-25 | End: 2022-03-25 | Stop reason: HOSPADM

## 2022-03-25 RX ORDER — ALBUTEROL SULFATE 90 UG/1
2 AEROSOL, METERED RESPIRATORY (INHALATION) EVERY 4 HOURS PRN
Status: DISCONTINUED | OUTPATIENT
Start: 2022-03-25 | End: 2022-03-27 | Stop reason: HOSPADM

## 2022-03-25 RX ORDER — SODIUM CHLORIDE 0.9 % (FLUSH) 0.9 %
10 SYRINGE (ML) INJECTION EVERY 12 HOURS SCHEDULED
Status: DISCONTINUED | OUTPATIENT
Start: 2022-03-25 | End: 2022-03-25

## 2022-03-25 RX ORDER — CARBOPROST TROMETHAMINE 250 UG/ML
250 INJECTION, SOLUTION INTRAMUSCULAR AS NEEDED
Status: DISCONTINUED | OUTPATIENT
Start: 2022-03-25 | End: 2022-03-25

## 2022-03-25 RX ORDER — FAMOTIDINE 10 MG/ML
20 INJECTION, SOLUTION INTRAVENOUS ONCE AS NEEDED
Status: COMPLETED | OUTPATIENT
Start: 2022-03-25 | End: 2022-03-25

## 2022-03-25 RX ORDER — ONDANSETRON 2 MG/ML
4 INJECTION INTRAMUSCULAR; INTRAVENOUS ONCE AS NEEDED
Status: COMPLETED | OUTPATIENT
Start: 2022-03-25 | End: 2022-03-25

## 2022-03-25 RX ORDER — DIPHENHYDRAMINE HCL 25 MG
25 CAPSULE ORAL EVERY 4 HOURS PRN
Status: DISCONTINUED | OUTPATIENT
Start: 2022-03-25 | End: 2022-03-27 | Stop reason: HOSPADM

## 2022-03-25 RX ORDER — OXYTOCIN/0.9 % SODIUM CHLORIDE 30/500 ML
125 PLASTIC BAG, INJECTION (ML) INTRAVENOUS CONTINUOUS PRN
Status: COMPLETED | OUTPATIENT
Start: 2022-03-25 | End: 2022-03-25

## 2022-03-25 RX ORDER — NALOXONE HCL 0.4 MG/ML
0.2 VIAL (ML) INJECTION
Status: DISCONTINUED | OUTPATIENT
Start: 2022-03-25 | End: 2022-03-27 | Stop reason: HOSPADM

## 2022-03-25 RX ORDER — LIDOCAINE HYDROCHLORIDE 10 MG/ML
5 INJECTION, SOLUTION EPIDURAL; INFILTRATION; INTRACAUDAL; PERINEURAL AS NEEDED
Status: DISCONTINUED | OUTPATIENT
Start: 2022-03-25 | End: 2022-03-25

## 2022-03-25 RX ORDER — OXYTOCIN/0.9 % SODIUM CHLORIDE 30/500 ML
999 PLASTIC BAG, INJECTION (ML) INTRAVENOUS ONCE
Status: COMPLETED | OUTPATIENT
Start: 2022-03-25 | End: 2022-03-25

## 2022-03-25 RX ORDER — PHENYLEPHRINE HYDROCHLORIDE 10 MG/ML
INJECTION INTRAVENOUS AS NEEDED
Status: DISCONTINUED | OUTPATIENT
Start: 2022-03-25 | End: 2022-03-25 | Stop reason: SURG

## 2022-03-25 RX ORDER — DIPHENHYDRAMINE HYDROCHLORIDE 50 MG/ML
25 INJECTION INTRAMUSCULAR; INTRAVENOUS EVERY 4 HOURS PRN
Status: DISCONTINUED | OUTPATIENT
Start: 2022-03-25 | End: 2022-03-27 | Stop reason: HOSPADM

## 2022-03-25 RX ORDER — BUDESONIDE 0.5 MG/2ML
0.5 INHALANT ORAL
Status: DISCONTINUED | OUTPATIENT
Start: 2022-03-25 | End: 2022-03-27 | Stop reason: HOSPADM

## 2022-03-25 RX ORDER — OXYTOCIN/0.9 % SODIUM CHLORIDE 30/500 ML
250 PLASTIC BAG, INJECTION (ML) INTRAVENOUS CONTINUOUS PRN
Status: ACTIVE | OUTPATIENT
Start: 2022-03-25 | End: 2022-03-25

## 2022-03-25 RX ORDER — METHYLERGONOVINE MALEATE 0.2 MG/ML
200 INJECTION INTRAVENOUS AS NEEDED
Status: DISCONTINUED | OUTPATIENT
Start: 2022-03-25 | End: 2022-03-25

## 2022-03-25 RX ORDER — SODIUM CHLORIDE, SODIUM LACTATE, POTASSIUM CHLORIDE, CALCIUM CHLORIDE 600; 310; 30; 20 MG/100ML; MG/100ML; MG/100ML; MG/100ML
125 INJECTION, SOLUTION INTRAVENOUS CONTINUOUS
Status: DISCONTINUED | OUTPATIENT
Start: 2022-03-25 | End: 2022-03-25

## 2022-03-25 RX ORDER — MORPHINE SULFATE 1 MG/ML
INJECTION, SOLUTION EPIDURAL; INTRATHECAL; INTRAVENOUS
Status: COMPLETED | OUTPATIENT
Start: 2022-03-25 | End: 2022-03-25

## 2022-03-25 RX ORDER — CEFAZOLIN SODIUM 2 G/100ML
2 INJECTION, SOLUTION INTRAVENOUS ONCE
Status: COMPLETED | OUTPATIENT
Start: 2022-03-25 | End: 2022-03-25

## 2022-03-25 RX ORDER — OXYCODONE AND ACETAMINOPHEN 10; 325 MG/1; MG/1
1 TABLET ORAL EVERY 4 HOURS PRN
Status: DISCONTINUED | OUTPATIENT
Start: 2022-03-25 | End: 2022-03-27 | Stop reason: HOSPADM

## 2022-03-25 RX ORDER — PHYTONADIONE 1 MG/.5ML
INJECTION, EMULSION INTRAMUSCULAR; INTRAVENOUS; SUBCUTANEOUS
Status: ACTIVE
Start: 2022-03-25 | End: 2022-03-26

## 2022-03-25 RX ORDER — SODIUM CHLORIDE 0.9 % (FLUSH) 0.9 %
1-10 SYRINGE (ML) INJECTION AS NEEDED
Status: DISCONTINUED | OUTPATIENT
Start: 2022-03-25 | End: 2022-03-25

## 2022-03-25 RX ORDER — SIMETHICONE 80 MG
80 TABLET,CHEWABLE ORAL
Status: DISCONTINUED | OUTPATIENT
Start: 2022-03-25 | End: 2022-03-27 | Stop reason: HOSPADM

## 2022-03-25 RX ORDER — ONDANSETRON 2 MG/ML
4 INJECTION INTRAMUSCULAR; INTRAVENOUS EVERY 6 HOURS PRN
Status: DISCONTINUED | OUTPATIENT
Start: 2022-03-25 | End: 2022-03-27 | Stop reason: HOSPADM

## 2022-03-25 RX ORDER — IBUPROFEN 800 MG/1
800 TABLET ORAL EVERY 6 HOURS PRN
Status: DISCONTINUED | OUTPATIENT
Start: 2022-03-25 | End: 2022-03-27 | Stop reason: HOSPADM

## 2022-03-25 RX ORDER — HYDROCORTISONE 25 MG/G
CREAM TOPICAL 3 TIMES DAILY PRN
Status: DISCONTINUED | OUTPATIENT
Start: 2022-03-25 | End: 2022-03-27 | Stop reason: HOSPADM

## 2022-03-25 RX ORDER — FENTANYL CITRATE 50 UG/ML
INJECTION, SOLUTION INTRAMUSCULAR; INTRAVENOUS
Status: COMPLETED | OUTPATIENT
Start: 2022-03-25 | End: 2022-03-25

## 2022-03-25 RX ORDER — CEFAZOLIN SODIUM 2 G/100ML
2 INJECTION, SOLUTION INTRAVENOUS ONCE
Status: COMPLETED | OUTPATIENT
Start: 2022-03-25 | End: 2022-03-26

## 2022-03-25 RX ORDER — ERYTHROMYCIN 5 MG/G
OINTMENT OPHTHALMIC
Status: ACTIVE
Start: 2022-03-25 | End: 2022-03-26

## 2022-03-25 RX ORDER — HYDROXYZINE 50 MG/1
50 TABLET, FILM COATED ORAL EVERY 6 HOURS PRN
Status: DISCONTINUED | OUTPATIENT
Start: 2022-03-25 | End: 2022-03-27 | Stop reason: HOSPADM

## 2022-03-25 RX ORDER — ACETAMINOPHEN 500 MG
1000 TABLET ORAL ONCE
Status: COMPLETED | OUTPATIENT
Start: 2022-03-25 | End: 2022-03-25

## 2022-03-25 RX ORDER — OXYCODONE HYDROCHLORIDE AND ACETAMINOPHEN 5; 325 MG/1; MG/1
1 TABLET ORAL EVERY 4 HOURS PRN
Status: DISCONTINUED | OUTPATIENT
Start: 2022-03-25 | End: 2022-03-27 | Stop reason: HOSPADM

## 2022-03-25 RX ORDER — MISOPROSTOL 200 UG/1
800 TABLET ORAL AS NEEDED
Status: DISCONTINUED | OUTPATIENT
Start: 2022-03-25 | End: 2022-03-25

## 2022-03-25 RX ORDER — HYDROMORPHONE HYDROCHLORIDE 1 MG/ML
0.5 INJECTION, SOLUTION INTRAMUSCULAR; INTRAVENOUS; SUBCUTANEOUS
Status: DISPENSED | OUTPATIENT
Start: 2022-03-25 | End: 2022-03-26

## 2022-03-25 RX ORDER — ACETAMINOPHEN 500 MG
1000 TABLET ORAL EVERY 6 HOURS PRN
Status: DISCONTINUED | OUTPATIENT
Start: 2022-03-25 | End: 2022-03-27 | Stop reason: HOSPADM

## 2022-03-25 RX ADMIN — CEFAZOLIN SODIUM 2 G: 2 INJECTION, SOLUTION INTRAVENOUS at 15:22

## 2022-03-25 RX ADMIN — ONDANSETRON 4 MG: 2 INJECTION INTRAMUSCULAR; INTRAVENOUS at 14:45

## 2022-03-25 RX ADMIN — ONDANSETRON 4 MG: 2 INJECTION INTRAMUSCULAR; INTRAVENOUS at 20:15

## 2022-03-25 RX ADMIN — PHENYLEPHRINE HYDROCHLORIDE 100 MCG: 10 INJECTION, SOLUTION INTRAVENOUS at 15:40

## 2022-03-25 RX ADMIN — FAMOTIDINE 20 MG: 10 INJECTION INTRAVENOUS at 14:45

## 2022-03-25 RX ADMIN — SODIUM CHLORIDE, POTASSIUM CHLORIDE, SODIUM LACTATE AND CALCIUM CHLORIDE 1000 ML: 600; 310; 30; 20 INJECTION, SOLUTION INTRAVENOUS at 13:46

## 2022-03-25 RX ADMIN — SODIUM CHLORIDE, POTASSIUM CHLORIDE, SODIUM LACTATE AND CALCIUM CHLORIDE 125 ML/HR: 600; 310; 30; 20 INJECTION, SOLUTION INTRAVENOUS at 14:43

## 2022-03-25 RX ADMIN — ACETAMINOPHEN 1000 MG: 500 TABLET ORAL at 14:45

## 2022-03-25 RX ADMIN — FENTANYL CITRATE 20 MCG: 0.05 INJECTION, SOLUTION INTRAMUSCULAR; INTRAVENOUS at 15:37

## 2022-03-25 RX ADMIN — Medication 125 ML/HR: at 17:40

## 2022-03-25 RX ADMIN — HYDROMORPHONE HYDROCHLORIDE 0.5 MG: 1 INJECTION, SOLUTION INTRAMUSCULAR; INTRAVENOUS; SUBCUTANEOUS at 18:21

## 2022-03-25 RX ADMIN — MORPHINE SULFATE 200 MCG: 1 INJECTION, SOLUTION EPIDURAL; INTRATHECAL; INTRAVENOUS at 15:37

## 2022-03-25 RX ADMIN — Medication 999 ML/HR: at 16:03

## 2022-03-25 NOTE — ANESTHESIA PROCEDURE NOTES
Spinal Block      Patient reassessed immediately prior to procedure    Patient location during procedure: OR  Start Time: 3/25/2022 3:32 PM  Stop Time: 3/25/2022 3:37 PM  Indication:at surgeon's request  Performed By  Anesthesiologist: Miko Denise MD  Preanesthetic Checklist  Completed: patient identified, IV checked, site marked, risks and benefits discussed, surgical consent, monitors and equipment checked, pre-op evaluation and timeout performed  Spinal Block Prep:  Patient Position:sitting  Sterile Tech:cap, gloves, mask and sterile barriers  Prep:Chloraprep  Patient Monitoring:blood pressure monitoring and continuous pulse oximetry  Spinal Block Procedure  Approach:midline  Guidance:landmark technique  Location:L3-L4  Needle Type:Sprotte  Needle Gauge:24  Placement of Spinal needle event:cerebrospinal fluid aspirated  Paresthesia: no  Fluid Appearance:clear  Medications: fentaNYL citrate (PF) (SUBLIMAZE) injection, 20 mcg  Morphine PF injection, 200 mcg  Med Administered at 3/25/2022 3:37 PM   Post Assessment  Patient Tolerance:patient tolerated the procedure well with no apparent complications  Complications no

## 2022-03-25 NOTE — ANESTHESIA PREPROCEDURE EVALUATION
Anesthesia Evaluation     Patient summary reviewed and Nursing notes reviewed   NPO Solid Status: > 8 hours  NPO Liquid Status: > 2 hours           Airway   Mallampati: II  TM distance: >3 FB  Neck ROM: full  Dental - normal exam     Pulmonary - normal exam    breath sounds clear to auscultation  (+) asthma,  Cardiovascular - negative cardio ROS and normal exam    Rhythm: regular  Rate: normal    (-) angina, orthopnea, PND, ARTIS      Neuro/Psych  (+) psychiatric history Anxiety,    GI/Hepatic/Renal/Endo    (+)   diabetes mellitus gestational,     Musculoskeletal (-) negative ROS    Abdominal    Substance History - negative use     OB/GYN    (+) Pregnant,         Other - negative ROS                       Anesthesia Plan    ASA 2     spinal       Anesthetic plan, all risks, benefits, and alternatives have been provided, discussed and informed consent has been obtained with: patient.        CODE STATUS:    Level Of Support Discussed With: Patient  Code Status (Patient has no pulse and is not breathing): CPR (Attempt to Resuscitate)  Medical Interventions (Patient has pulse or is breathing): Full Support

## 2022-03-25 NOTE — ANESTHESIA POSTPROCEDURE EVALUATION
Patient: Tracie Aguilar    Procedure Summary     Date: 22 Room / Location:  [facility]    Anesthesia Start: 152 Anesthesia Stop:     Procedure:  SECTION REPEAT (N/A Abdomen) Diagnosis:       Previous  section      (Previous  section [Z98.891])    Surgeons: Chris Fisher MD Provider: Miko Denise MD    Anesthesia Type: spinal ASA Status: 2          Anesthesia Type: spinal    Vitals  Vitals Value Taken Time   /64 22   Temp 36.7 °C (98.1 °F) 22 1651   Pulse 58 22 1825   Resp 18 22   SpO2 100 % 22   Vitals shown include unvalidated device data.        Post Anesthesia Care and Evaluation    Patient location during evaluation: bedside  Patient participation: complete - patient participated  Level of consciousness: awake  Pain management: adequate  Airway patency: patent  Anesthetic complications: No anesthetic complications  PONV Status: controlled  Cardiovascular status: acceptable  Respiratory status: acceptable  Hydration status: acceptable    Comments: --------------------            22               1815     --------------------   BP:       108/64     Pulse:      59       Resp:       18       Temp:                SpO2:      100%     --------------------

## 2022-03-26 LAB
BASOPHILS # BLD AUTO: 0.04 10*3/MM3 (ref 0–0.2)
BASOPHILS NFR BLD AUTO: 0.5 % (ref 0–1.5)
DEPRECATED RDW RBC AUTO: 39.8 FL (ref 37–54)
EOSINOPHIL # BLD AUTO: 0.03 10*3/MM3 (ref 0–0.4)
EOSINOPHIL NFR BLD AUTO: 0.4 % (ref 0.3–6.2)
ERYTHROCYTE [DISTWIDTH] IN BLOOD BY AUTOMATED COUNT: 12.6 % (ref 12.3–15.4)
HCT VFR BLD AUTO: 29 % (ref 34–46.6)
HGB BLD-MCNC: 9.6 G/DL (ref 12–15.9)
IMM GRANULOCYTES # BLD AUTO: 0.03 10*3/MM3 (ref 0–0.05)
IMM GRANULOCYTES NFR BLD AUTO: 0.4 % (ref 0–0.5)
LYMPHOCYTES # BLD AUTO: 1.66 10*3/MM3 (ref 0.7–3.1)
LYMPHOCYTES NFR BLD AUTO: 19.4 % (ref 19.6–45.3)
MCH RBC QN AUTO: 28.9 PG (ref 26.6–33)
MCHC RBC AUTO-ENTMCNC: 33.1 G/DL (ref 31.5–35.7)
MCV RBC AUTO: 87.3 FL (ref 79–97)
MONOCYTES # BLD AUTO: 0.49 10*3/MM3 (ref 0.1–0.9)
MONOCYTES NFR BLD AUTO: 5.7 % (ref 5–12)
NEUTROPHILS NFR BLD AUTO: 6.3 10*3/MM3 (ref 1.7–7)
NEUTROPHILS NFR BLD AUTO: 73.6 % (ref 42.7–76)
NRBC BLD AUTO-RTO: 0 /100 WBC (ref 0–0.2)
PLATELET # BLD AUTO: 155 10*3/MM3 (ref 140–450)
PMV BLD AUTO: 10.9 FL (ref 6–12)
RBC # BLD AUTO: 3.32 10*6/MM3 (ref 3.77–5.28)
WBC NRBC COR # BLD: 8.55 10*3/MM3 (ref 3.4–10.8)

## 2022-03-26 PROCEDURE — 85025 COMPLETE CBC W/AUTO DIFF WBC: CPT | Performed by: OBSTETRICS & GYNECOLOGY

## 2022-03-26 PROCEDURE — 25010000002 CEFAZOLIN IN DEXTROSE 2-4 GM/100ML-% SOLUTION: Performed by: OBSTETRICS & GYNECOLOGY

## 2022-03-26 RX ADMIN — SIMETHICONE 80 MG: 80 TABLET, CHEWABLE ORAL at 08:57

## 2022-03-26 RX ADMIN — IBUPROFEN 800 MG: 800 TABLET, FILM COATED ORAL at 08:57

## 2022-03-26 RX ADMIN — ACETAMINOPHEN 1000 MG: 500 TABLET ORAL at 20:09

## 2022-03-26 RX ADMIN — CEFAZOLIN SODIUM 2 G: 2 INJECTION, SOLUTION INTRAVENOUS at 00:08

## 2022-03-26 RX ADMIN — ACETAMINOPHEN 1000 MG: 500 TABLET ORAL at 13:27

## 2022-03-26 RX ADMIN — DOCUSATE SODIUM 100 MG: 100 CAPSULE, LIQUID FILLED ORAL at 20:09

## 2022-03-26 RX ADMIN — SIMETHICONE 80 MG: 80 TABLET, CHEWABLE ORAL at 18:25

## 2022-03-26 RX ADMIN — IBUPROFEN 800 MG: 800 TABLET, FILM COATED ORAL at 18:25

## 2022-03-26 RX ADMIN — SIMETHICONE 80 MG: 80 TABLET, CHEWABLE ORAL at 20:09

## 2022-03-26 RX ADMIN — DOCUSATE SODIUM 100 MG: 100 CAPSULE, LIQUID FILLED ORAL at 08:57

## 2022-03-26 RX ADMIN — Medication 1 APPLICATION: at 13:20

## 2022-03-27 VITALS
BODY MASS INDEX: 38.09 KG/M2 | WEIGHT: 207 LBS | HEIGHT: 62 IN | DIASTOLIC BLOOD PRESSURE: 71 MMHG | HEART RATE: 70 BPM | RESPIRATION RATE: 16 BRPM | SYSTOLIC BLOOD PRESSURE: 114 MMHG | OXYGEN SATURATION: 100 % | TEMPERATURE: 98.7 F

## 2022-03-27 RX ORDER — IBUPROFEN 800 MG/1
800 TABLET ORAL EVERY 6 HOURS PRN
Qty: 30 TABLET | Refills: 0 | Status: SHIPPED | OUTPATIENT
Start: 2022-03-27 | End: 2022-05-09

## 2022-03-27 RX ORDER — SIMETHICONE 80 MG
80 TABLET,CHEWABLE ORAL
Qty: 120 TABLET | Refills: 0 | Status: SHIPPED | OUTPATIENT
Start: 2022-03-27 | End: 2022-04-15

## 2022-03-27 RX ORDER — OXYCODONE HYDROCHLORIDE AND ACETAMINOPHEN 5; 325 MG/1; MG/1
1 TABLET ORAL EVERY 4 HOURS PRN
Qty: 15 TABLET | Refills: 0 | Status: SHIPPED | OUTPATIENT
Start: 2022-03-27 | End: 2022-04-01

## 2022-03-27 RX ADMIN — ACETAMINOPHEN 1000 MG: 500 TABLET ORAL at 02:43

## 2022-03-27 RX ADMIN — ACETAMINOPHEN 1000 MG: 500 TABLET ORAL at 09:25

## 2022-03-27 RX ADMIN — IBUPROFEN 800 MG: 800 TABLET, FILM COATED ORAL at 04:42

## 2022-03-27 RX ADMIN — IBUPROFEN 800 MG: 800 TABLET, FILM COATED ORAL at 12:48

## 2022-03-27 RX ADMIN — SIMETHICONE 80 MG: 80 TABLET, CHEWABLE ORAL at 09:25

## 2022-03-27 RX ADMIN — MUPIROCIN: 20 OINTMENT TOPICAL at 06:21

## 2022-03-27 RX ADMIN — DOCUSATE SODIUM 100 MG: 100 CAPSULE, LIQUID FILLED ORAL at 09:25

## 2022-04-15 ENCOUNTER — POSTPARTUM VISIT (OUTPATIENT)
Dept: OBSTETRICS AND GYNECOLOGY | Age: 32
End: 2022-04-15

## 2022-04-15 VITALS
WEIGHT: 185 LBS | BODY MASS INDEX: 34.04 KG/M2 | HEIGHT: 62 IN | SYSTOLIC BLOOD PRESSURE: 126 MMHG | DIASTOLIC BLOOD PRESSURE: 74 MMHG

## 2022-04-15 DIAGNOSIS — Z98.890 POST-OPERATIVE STATE: Primary | ICD-10-CM

## 2022-04-15 PROCEDURE — 99024 POSTOP FOLLOW-UP VISIT: CPT | Performed by: OBSTETRICS & GYNECOLOGY

## 2022-04-15 NOTE — PROGRESS NOTES
"Subjective   Chief Complaint   Patient presents with   • Post-op Follow-up     Tracie Aguilar is a 31 y.o. year old  presenting to be seen for her post-operative visit.  Currently she reports no problems with eating, bowel movements, voiding, or wound drainage.    The pathology results from her procedure are in Tracie's record and are benign.      OTHER THINGS SHE WANTS TO DISCUSS TODAY:  Nothing else    The following portions of the patient's history were reviewed and updated as appropriate:current medications and allergies       Objective   /74   Ht 157.5 cm (62\")   Wt 83.9 kg (185 lb)   LMP 2021 (Exact Date)   Breastfeeding Yes   BMI 33.84 kg/m²     General:  well developed; well nourished  no acute distress   Abdomen: soft, non-tender; no masses  incision is clean, dry, intact and without drainage   Pelvis: Not performed.          Assessment   1. S/P      Plan   1. Avoid tampons, douching and intercourse  2. The importance of keeping all planned follow-up and taking all medications as prescribed was emphasized.  3. Follow up for annual exam and for postpartum visit 3 week.    No orders of the defined types were placed in this encounter.             Note: Speech recognition transcription software may have been used to create portions of this document.  An attempt at proofreading has been made but errors in transcription could still be present.    "

## 2022-04-25 ENCOUNTER — TELEPHONE (OUTPATIENT)
Dept: OBSTETRICS AND GYNECOLOGY | Age: 32
End: 2022-04-25

## 2022-04-25 RX ORDER — ONDANSETRON 4 MG/1
4 TABLET, FILM COATED ORAL DAILY PRN
Qty: 30 TABLET | Refills: 1 | Status: SHIPPED | OUTPATIENT
Start: 2022-04-25 | End: 2022-05-09

## 2022-04-25 NOTE — TELEPHONE ENCOUNTER
Pt is 4 weeks postpartum and believes this is developing mastitis in the LT breast. Symptoms began yesterday morning with swollen nipple, pain, chills, low grade fever. Can we call her in something or do you want her to be seen? Please advise

## 2022-04-25 NOTE — TELEPHONE ENCOUNTER
We could send in the patient dicloxacillin 500 mg 1 p.o. 4 times daily for 10 days.  She does have an allergy to penicillins but it looks like it is only nausea and vomiting.  Some Zofran could be sent and also to prevent that.  If she does not want to take penicillin Keflex 500 mg 1 p.o. 3 times daily could be called in.

## 2022-05-03 ENCOUNTER — HOSPITAL ENCOUNTER (OUTPATIENT)
Dept: LACTATION | Facility: HOSPITAL | Age: 32
Discharge: HOME | End: 2022-05-03

## 2022-05-03 NOTE — LACTATION NOTE
Baby Virginie is 5 weeks old and nursing is a struggle for her . Lots of choking , gagging and Mom reports color change at most feeding that resolves quickly. Baby is thriving and gaining well . She removes milk quickly mostly due to mom having full breasts and a strong ejection reflex. She is aware if ways to decrease strength of spray but is trying to avoid increasing milk supply by pumping or expressing prior to latch. She is currently being treated for mastitis in her left breast. Baby takes one breast per feeding. Today we worked on more upright positioning but it did not seem to help Virginie's struggles and she comes on and off the breast frequently through out the feed. She obtained 2 oz in 7 minutes. Baby has a lot of inspiratory stridor and even her saliva seems to choke her at times. A call was placed to the pediatrician and a referral requested for a Speech evaluation  At [facility].    Birth weight 6-15  Lowest weight 6-9  Today 8-6.5  Transferred 2 oz milk in 7 minutes  Lactation Consult Note    Evaluation Completed: 5/3/2022 09:31 EDT  Patient Name: Tracie Aguilar  :  1990  MRN:  [id number]     REFERRAL  INFORMATION:                          Date of Referral: 22   Person Making Referral: patient  Maternal Reason for Referral: breastfeeding currently       DELIVERY HISTORY:        Skin to skin initiation date/time: 3/25/2022  4:14 PM   Skin to skin end date/time:           MATERNAL ASSESSMENT:  Breast Size Issue: none (22)  Breast Shape: round (22)  Breast Density: full (22)  Areola: elastic (22)  Nipples: everted (22)     Left Nipple Symptoms: intact, nontender (22)          INFANT ASSESSMENT:  Information for the patient's :  LaurenVirginie [[id number]]   No past medical history on file.                                                                                                     MATERNAL INFANT FEEDING:      Maternal Emotional State: receptive (05/03/22 0800)  Infant Positioning: clutch/football (05/03/22 0800)   Signs of Milk Transfer: audible swallow, deep jaw excursions noted, other (see comments) (inspiratory stridor , choking with color change) (05/03/22 0800)  Pain with Feeding: no (05/03/22 0800)              Comfort Measures Following Feeding: air-drying encouraged (05/03/22 0800)        Latch Assistance: none needed (05/03/22 0800)                               EQUIPMENT TYPE:                                 BREAST PUMPING:          LACTATION REFERRALS:

## 2022-05-09 ENCOUNTER — POSTPARTUM VISIT (OUTPATIENT)
Dept: OBSTETRICS AND GYNECOLOGY | Age: 32
End: 2022-05-09

## 2022-05-09 VITALS
SYSTOLIC BLOOD PRESSURE: 108 MMHG | HEIGHT: 62 IN | WEIGHT: 184 LBS | DIASTOLIC BLOOD PRESSURE: 70 MMHG | BODY MASS INDEX: 33.86 KG/M2

## 2022-05-09 DIAGNOSIS — Z11.51 SPECIAL SCREENING EXAMINATION FOR HUMAN PAPILLOMAVIRUS (HPV): ICD-10-CM

## 2022-05-09 DIAGNOSIS — O24.419 GDM, CLASS A2: Primary | ICD-10-CM

## 2022-05-09 DIAGNOSIS — Z12.4 SCREENING FOR MALIGNANT NEOPLASM OF THE CERVIX: ICD-10-CM

## 2022-05-09 PROBLEM — O99.820 GBS (GROUP B STREPTOCOCCUS CARRIER), +RV CULTURE, CURRENTLY PREGNANT: Status: RESOLVED | Noted: 2021-09-20 | Resolved: 2022-05-09

## 2022-05-09 PROBLEM — M54.9 BACK PAIN AFFECTING PREGNANCY: Status: RESOLVED | Noted: 2021-11-16 | Resolved: 2022-05-09

## 2022-05-09 PROBLEM — Z98.891 PREVIOUS CESAREAN SECTION: Status: RESOLVED | Noted: 2021-09-20 | Resolved: 2022-05-09

## 2022-05-09 PROBLEM — R03.0 ELEVATED BLOOD PRESSURE READING: Status: RESOLVED | Noted: 2022-03-09 | Resolved: 2022-05-09

## 2022-05-09 PROBLEM — O99.891 BACK PAIN AFFECTING PREGNANCY: Status: RESOLVED | Noted: 2021-11-16 | Resolved: 2022-05-09

## 2022-05-09 PROBLEM — Z34.90 PREGNANCY: Status: RESOLVED | Noted: 2022-03-09 | Resolved: 2022-05-09

## 2022-05-09 PROBLEM — Z98.891 H/O CESAREAN SECTION: Status: RESOLVED | Noted: 2022-03-25 | Resolved: 2022-05-09

## 2022-05-09 PROCEDURE — 0503F POSTPARTUM CARE VISIT: CPT | Performed by: OBSTETRICS & GYNECOLOGY

## 2022-05-09 RX ORDER — ACETAMINOPHEN AND CODEINE PHOSPHATE 120; 12 MG/5ML; MG/5ML
1 SOLUTION ORAL DAILY
Qty: 28 TABLET | Refills: 11 | Status: SHIPPED | OUTPATIENT
Start: 2022-05-09 | End: 2022-08-29

## 2022-05-09 NOTE — PROGRESS NOTES
"Subjective   Tracie Aguilar is a 31 y.o. female who presents for a postpartum visit. She is 6 weeks postpartum following a c/s . I have fully reviewed the prenatal and intrapartum course. Postpartum course has been uneventful. Baby is feeding by breast. Bleeding has been normal in amount and decreasing. Bowel function is normal. Bladder function is normal. Patient not sexually active at this time. Contraception method is discussed. Postpartum depression screening: negative.     Patient had some mastitis on the left.  She is still having some discomfort and pressure above the nipple on the left breast.     The following portions of the patient's history were reviewed and updated as appropriate: allergies, current medications,and problem list.    Review of Systems  Pertinent items are noted in HPI.    Objective   /70   Ht 157.5 cm (62\")   Wt 83.5 kg (184 lb)   LMP 06/30/2021 (Exact Date)   Breastfeeding Yes   BMI 33.65 kg/m²    General:  Alert and oriented, NAD    Breasts:   Left breast has no skin or nipple changes.  No dominant masses are felt.  Lactational changes are felt.       Heart:     Abdomen: Normal findings, nontender    Vulva: Normal, well-healed    Vagina: No lesions or abnormal discharge   Cervix:  Normal with no cervical motion tenderness   Corpus: Normal for post partum visit   Adnexa:  Non tender, non enlarged         Assessment/Plan     Normal postpartum exam. Pap smear done at today's visit.    Gestational diabetes-checks 2-hour glucose tolerance test today.  Discussed importance of maintaining good diet and exercise throughout life.  Encourage patient to periodically have hemoglobin A1c checked in the future.    1. Contraception: IUD mirena and.  Patient will do progesterone only pill for about 2 months and then return for IUD placement.  She has had a Mirena in the past.  We discussed risk and benefits.  2. Slow return to normal activities reviewed. Continue prenatal vitamins.  3. " Follow up in 12 months or sooner as needed.

## 2022-05-10 LAB
GLUCOSE 1H P 75 G GLC PO SERPL-MCNC: 77 MG/DL (ref 65–179)
GLUCOSE 2H P 75 G GLC PO SERPL-MCNC: 90 MG/DL (ref 65–152)
GLUCOSE P FAST SERPL-MCNC: 73 MG/DL (ref 65–91)

## 2022-05-12 LAB
CYTOLOGIST CVX/VAG CYTO: NORMAL
CYTOLOGY CVX/VAG DOC CYTO: NORMAL
CYTOLOGY CVX/VAG DOC THIN PREP: NORMAL
DX ICD CODE: NORMAL
HIV 1 & 2 AB SER-IMP: NORMAL
HPV I/H RISK 4 DNA CVX QL PROBE+SIG AMP: NEGATIVE
OTHER STN SPEC: NORMAL
STAT OF ADQ CVX/VAG CYTO-IMP: NORMAL

## 2022-07-15 ENCOUNTER — OFFICE VISIT (OUTPATIENT)
Dept: OBSTETRICS AND GYNECOLOGY | Age: 32
End: 2022-07-15

## 2022-07-15 VITALS
HEIGHT: 62 IN | DIASTOLIC BLOOD PRESSURE: 74 MMHG | WEIGHT: 184 LBS | BODY MASS INDEX: 33.86 KG/M2 | SYSTOLIC BLOOD PRESSURE: 118 MMHG

## 2022-07-15 DIAGNOSIS — Z32.00 ENCOUNTER FOR PREGNANCY TEST, RESULT UNKNOWN: Primary | ICD-10-CM

## 2022-07-15 DIAGNOSIS — Z30.430 ENCOUNTER FOR IUD INSERTION: ICD-10-CM

## 2022-07-15 LAB
B-HCG UR QL: NEGATIVE
EXPIRATION DATE: NORMAL
INTERNAL NEGATIVE CONTROL: NEGATIVE
INTERNAL POSITIVE CONTROL: POSITIVE
Lab: NORMAL

## 2022-07-15 PROCEDURE — 81025 URINE PREGNANCY TEST: CPT | Performed by: OBSTETRICS & GYNECOLOGY

## 2022-07-15 PROCEDURE — 58300 INSERT INTRAUTERINE DEVICE: CPT | Performed by: OBSTETRICS & GYNECOLOGY

## 2022-07-15 NOTE — PROGRESS NOTES
IUD Insertion    No LMP recorded.    Date of procedure:  7/15/2022    Risks and benefits discussed? yes  All questions answered? yes  Consents given by The patient  Written consent obtained? yes    Local anesthesia used:  no    Procedure documentation:    After verifying the patient had a low probability of being pregnant and met the criteria for insertion, a sterile speculum has placed and the cervix was cleansed with an antiseptic solution.  Vaginal discharge was scant.  The anterior lip of the cervix was grasped with a tenaculum and the uterine cavity was gently sounded. There was no difficulty passing the sound through the cervix.  Cervical dilation did not need to be performed prior to placing the IUD.  The uterus was anteverted and sounded to 8 cms.  The Mirena was then prepared per the manufacturers instructions.    The Mirena was advanced to a point 2 cms from the fundus and then the arms were released from the sheath.  The device was advanced to the fundus and the device was released fully from the sheath. The string was cut 3 cms in length.  Bleeding from the cervix was scant.    She tolerated the procedure without any difficulty.     Post procedure instructions: It was reviewed that the Mirena will not alter the timing of when she bleeds but it may decrease the quantity of flow and cramps.  Roughly 30% of people will be amenorrheic over time.  Efficacy rate of 99.2% over 7 years was discussed.  Spontaneous expulsion rate of 1-2% was also discussed.  If she has any issue with fever or excessive bleeding or pain she is to call the office immediately.  Otherwise I would like to see her back in 6 weeks with an ultrasound to confirm correct placement.    This note was electronically signed.      July 15, 2022

## 2022-08-29 ENCOUNTER — OFFICE VISIT (OUTPATIENT)
Dept: OBSTETRICS AND GYNECOLOGY | Age: 32
End: 2022-08-29

## 2022-08-29 VITALS
DIASTOLIC BLOOD PRESSURE: 66 MMHG | BODY MASS INDEX: 34.23 KG/M2 | WEIGHT: 186 LBS | SYSTOLIC BLOOD PRESSURE: 108 MMHG | HEIGHT: 62 IN

## 2022-08-29 DIAGNOSIS — Z30.431 IUD CHECK UP: Primary | ICD-10-CM

## 2022-08-29 PROCEDURE — 99213 OFFICE O/P EST LOW 20 MIN: CPT | Performed by: OBSTETRICS & GYNECOLOGY

## 2022-08-29 NOTE — PROGRESS NOTES
"  Chief complaint-IUD check and depression    History of present illness- Patient is a 31 y.o.  who is here for an IUD check.  She has had a lot of irregular bleeding but wants to continue with IUD.  Patient has also had some depression.  She has even had occasional suicidal thoughts.  She would very much like to start a medication and be referred to psychiatry.  She has no suicidal thoughts now.  Her daughter is having some work-up for some respiratory issues and possible low tone.  Her daughter is eating well.    Review of Systems   Constitutional: Negative for fatigue.   Respiratory: Negative for shortness of breath.    Gastrointestinal: Negative for abdominal pain.   Genitourinary: Negative for dysuria.   Neurological: Negative for headaches.   Psychiatric/Behavioral: Negative for dysphoric mood.     /66   Ht 157.5 cm (62\")   Wt 84.4 kg (186 lb)   Breastfeeding No   BMI 34.02 kg/m²   Physical Exam  Constitutional:       Appearance: Normal appearance.   Neurological:      Mental Status: She is alert.   Psychiatric:         Mood and Affect: Mood normal.         Thought Content: Thought content normal.         Judgment: Judgment normal.         Pelvic ultrasound shows the IUD appears in proper position.    Diagnoses and all orders for this visit:    1. IUD check up (Primary)    2. Postpartum depression  -     Ambulatory Referral to Psychiatry    Other orders  -     sertraline (Zoloft) 50 MG tablet; Take 1 tablet by mouth Daily.  Dispense: 90 tablet; Refill: 3    The patient would like to do an SSRI which I think is appropriate.  She will start with Zoloft 50 mg daily.  If not improving we will increase to 100 mg.  We will also refer her to psychiatry.  We discussed irregular bleeding that is common with the Mirena.  Patient will call back if not improved.  "

## 2023-07-01 NOTE — NURSING NOTE
Chemistry called this nurse to inform that there is usually two machines that run PCR ratios on but tonight, one is not working an there is only one line to operate all the labs on. Nurse asked when they think the estimated time frame will be before a result will be available on this lab. Ws told may in one to two hours   Statement Selected

## 2023-10-10 ENCOUNTER — OFFICE VISIT (OUTPATIENT)
Dept: OBSTETRICS AND GYNECOLOGY | Age: 33
End: 2023-10-10
Payer: COMMERCIAL

## 2023-10-10 VITALS
DIASTOLIC BLOOD PRESSURE: 74 MMHG | WEIGHT: 181 LBS | SYSTOLIC BLOOD PRESSURE: 108 MMHG | BODY MASS INDEX: 33.31 KG/M2 | HEIGHT: 62 IN

## 2023-10-10 DIAGNOSIS — T83.32XA INTRAUTERINE CONTRACEPTIVE DEVICE THREADS LOST, INITIAL ENCOUNTER: ICD-10-CM

## 2023-10-10 DIAGNOSIS — Z01.419 ENCOUNTER FOR GYNECOLOGICAL EXAMINATION WITHOUT ABNORMAL FINDING: Primary | ICD-10-CM

## 2023-10-10 RX ORDER — SENNOSIDES 8.6 MG
CAPSULE ORAL
COMMUNITY
End: 2023-10-10

## 2023-10-10 RX ORDER — ESCITALOPRAM OXALATE 10 MG/1
10 TABLET ORAL DAILY
COMMUNITY
Start: 2023-08-11 | End: 2024-08-10

## 2023-10-10 NOTE — PROGRESS NOTES
Subjective     Chief Complaint   Patient presents with    Gynecologic Exam     AC       History of Present Illness    Tracie Aguilar is a 32 y.o.  who presents for annual exam.  The patient's 2 daughters are 18 months and 3-1/2.  Patient stopped breast-feeding about 4 months ago.  She has noticed some tenderness in her right breast.  She is also noticed low libido.  She was being worked up by urology for some blood in her urine.  She notes she has anxiety and was changed to Lexapro.  She never took the Zoloft I gave her for postpartum depression.  She has noticed that sometimes her clitoris will feel sore.  No lesions.  She has a Mirena IUD and rarely has bleeding.    Her menses are rare, lasting 0-3 days, dysmenorrhea none   Obstetric History:  OB History          2    Para   2    Term   2            AB        Living   2         SAB        IAB        Ectopic        Molar        Multiple   0    Live Births   2               Menstrual History:     No LMP recorded. Patient has had an implant.         Current contraception: IUD Mirena IUD placed in 2022  History of abnormal Pap smear: no  Received Gardasil immunization: yes  Perform regular self breast exam : yes  Family history of uterine or ovarian cancer: no  Family History of colon cancer: no  Family history of breast cancer  MGM  55    Mammogram: not indicated.  Colonoscopy: not indicated.  DEXA: not indicated.    Exercise: exercises 4 times a week  Calcium/Vitamin D: inadequate intake    The following portions of the patient's history were reviewed and updated as appropriate: allergies, current medications, past family history, past medical history, past social history, past surgical history, and problem list.    Review of Systems    Review of Systems   Constitutional: Negative for fatigue.   Respiratory: Negative for shortness of breath.    Gastrointestinal: Negative for abdominal pain.   Genitourinary: Negative for dysuria.  "  Neurological: Negative for headaches.   Psychiatric/Behavioral: Negative for dysphoric mood.     Objective   Physical Exam    /74   Ht 157.5 cm (62\")   Wt 82.1 kg (181 lb)   BMI 33.11 kg/mý     General:   alert, appears stated age and cooperative   Neck: thyroid normal to palpation   Heart: regular rate and rhythm   Lungs: clear to auscultation bilaterally   Abdomen: soft, non-tender, without masses or organomegaly   Breast: inspection negative, no nipple discharge or bleeding, no masses or nodularity palpable   Vulva: normal, Bartholin's, Urethra, Levasy's normal   Vagina: normal mucosa, normal discharge   Cervix: no cervical motion tenderness and no lesions   Uterus: non-tender, normal shape and consistency   Adnexa: no mass, fullness, tenderness   Rectal: not indicated   Ultrasound does show that the IUD is in proper placement.  Ovaries do have bilateral cyst.  The right ovary has 2 simple appearing areas 1 measures 4.3 cm the other measures 3.5 cm.  The left ovary has a simple appearing cyst measuring 3.6 cm.  There is no free fluid in the pelvis.      Assessment & Plan   Diagnoses and all orders for this visit:    1. Encounter for gynecological examination without abnormal finding (Primary)    2. Intrauterine contraceptive device threads lost, initial encounter    IUD string was not visible so ultrasound was requested.  IUD is in proper position but I do see bilateral simple appearing ovarian cyst.  I have asked for the patient to come back for 6-week repeat ultrasound.  We discussed libido in detail.  No lesions seen on the clitoris.  Possible some pelvic congestion from recent pregnancies  Patient has some right breast pain but no masses were felt.  She would like to do a baseline mammogram when she turns 34.  Asked patient to report any masses.    All questions answered.  Breast self exam technique reviewed and patient encouraged to perform self-exam monthly.  Discussed healthy lifestyle " modifications.  Recommended 30 minutes of aerobic exercise five times per week.  Discussed calcium needs to prevent osteoporosis.

## 2023-11-21 ENCOUNTER — OFFICE VISIT (OUTPATIENT)
Dept: OBSTETRICS AND GYNECOLOGY | Age: 33
End: 2023-11-21
Payer: COMMERCIAL

## 2023-11-21 VITALS
WEIGHT: 183 LBS | DIASTOLIC BLOOD PRESSURE: 76 MMHG | BODY MASS INDEX: 33.68 KG/M2 | HEIGHT: 62 IN | SYSTOLIC BLOOD PRESSURE: 124 MMHG

## 2023-11-21 DIAGNOSIS — N83.201 CYSTS OF BOTH OVARIES: ICD-10-CM

## 2023-11-21 DIAGNOSIS — N83.202 CYSTS OF BOTH OVARIES: ICD-10-CM

## 2023-11-21 PROCEDURE — 99213 OFFICE O/P EST LOW 20 MIN: CPT | Performed by: OBSTETRICS & GYNECOLOGY

## 2023-11-21 NOTE — PROGRESS NOTES
"  Chief yexgfyuko-rmjlhz-ow of bilateral cyst.    History of present illness- Patient is a 33 y.o.  who ultrasound at her last visit to check on her IUD.  She was noted incidentally to have bilateral small ovarian cyst.  On the right there was a 4.3 cm cyst and an adjacent 3.5 cm cyst.  On the left ovary there is a 3.6 cm cyst.  Patient is asymptomatic.  I asked her to come back for repeat ultrasound.    Review of Systems   Constitutional: Negative for fatigue.   Respiratory: Negative for shortness of breath.    Gastrointestinal: Negative for abdominal pain.   Genitourinary: Negative for dysuria.   Neurological: Negative for headaches.   Psychiatric/Behavioral: Negative for dysphoric mood.     /76   Ht 157.5 cm (62\")   Wt 83 kg (183 lb)   BMI 33.47 kg/m²   Physical Exam  Constitutional:       Appearance: Normal appearance.   Pulmonary:      Effort: Pulmonary effort is normal.   Neurological:      Mental Status: She is alert.   Psychiatric:         Mood and Affect: Mood normal.         Thought Content: Thought content normal.         Judgment: Judgment normal.         Pelvic ultrasound shows anteverted normal-sized uterus with IUD in proper placement.  There is some free fluid in the pelvis.  The cyst on the right ovary do appear smaller.  Previously the largest cyst measured 4.3 cm and now measures 3.5.  The smaller area measured 3.5 cm and now measures 2.7 cm in greatest dimension.  The left ovary now appears small with no cyst.  Previously seen 3.6 cm cyst has resolved.    Diagnoses and all orders for this visit:    1. Cysts of both ovaries  -     US Non-ob Transvaginal    Left ovarian cyst resolved.  Right ovarian cyst appears smaller.  There is some free fluid in the pelvis and patient is asymptomatic.  Recommend nothing to do at this point unless patient is symptomatic.  Patient wishes to continue with her IUD.  We did discuss that combination oral contraceptive pills can suppress the ovary but " the patient wishes to stay with IUD.

## 2024-11-01 ENCOUNTER — OFFICE VISIT (OUTPATIENT)
Dept: OBSTETRICS AND GYNECOLOGY | Age: 34
End: 2024-11-01
Payer: COMMERCIAL

## 2024-11-01 VITALS
WEIGHT: 195 LBS | SYSTOLIC BLOOD PRESSURE: 126 MMHG | DIASTOLIC BLOOD PRESSURE: 78 MMHG | BODY MASS INDEX: 35.88 KG/M2 | HEIGHT: 62 IN

## 2024-11-01 DIAGNOSIS — R10.2 PELVIC PAIN: ICD-10-CM

## 2024-11-01 DIAGNOSIS — Z13.89 SCREENING FOR BLOOD OR PROTEIN IN URINE: Primary | ICD-10-CM

## 2024-11-01 DIAGNOSIS — Z11.51 SCREENING FOR HUMAN PAPILLOMAVIRUS (HPV): ICD-10-CM

## 2024-11-01 DIAGNOSIS — Z12.31 ENCOUNTER FOR SCREENING MAMMOGRAM FOR MALIGNANT NEOPLASM OF BREAST: ICD-10-CM

## 2024-11-01 DIAGNOSIS — Z12.4 SCREENING FOR MALIGNANT NEOPLASM OF CERVIX: ICD-10-CM

## 2024-11-01 DIAGNOSIS — R63.5 WEIGHT GAIN: ICD-10-CM

## 2024-11-01 DIAGNOSIS — Z01.419 WELL FEMALE EXAM WITH ROUTINE GYNECOLOGICAL EXAM: ICD-10-CM

## 2024-11-01 LAB
BILIRUB BLD-MCNC: NEGATIVE MG/DL
CLARITY, POC: CLEAR
COLOR UR: YELLOW
GLUCOSE UR STRIP-MCNC: NEGATIVE MG/DL
KETONES UR QL: NEGATIVE
LEUKOCYTE EST, POC: NEGATIVE
NITRITE UR-MCNC: NEGATIVE MG/ML
PH UR: 6 [PH] (ref 5–8)
PROT UR STRIP-MCNC: NEGATIVE MG/DL
RBC # UR STRIP: ABNORMAL /UL
SP GR UR: 1.02 (ref 1–1.03)
UROBILINOGEN UR QL: NORMAL

## 2024-11-01 NOTE — PROGRESS NOTES
Subjective     Chief Complaint   Patient presents with    Annual Exam     Annual exam c/o possible uti, and lower abdominal pain hx of cyst.  last pap 2022 neg/neg       History of Present Illness    Tracie Aguilar is a 34 y.o.  who presents for annual exam.  Patient has had a history of some right ovarian cyst.  The last time we looked they had decreased in size to 3.5 cm and 2.7 cm.  Patient notes that her mom does ultrasound at [place] and is done some ultrasounds on her.  Patient has had some pain on the right intermittently.  She also has some cramping with sex.  It does not keep her from having sex.  She found her libido is better after stopping her SSRI.  She describes the pain as quick and brief.  She describes it as feeling like a charley horse.  She does have some history of IBS.  Overall she is happy with her Mirena and wants to continue with it.  She does not want to do a combined oral contraceptive pill.  She is interested in doing genetic testing.  Patient has having some weight gain and would like some hormonal testing.  Her menses are rare, lasting 0-3 days, dysmenorrhea none   Obstetric History:  OB History          2    Para   2    Term   2            AB        Living   2         SAB        IAB        Ectopic        Molar        Multiple   0    Live Births   2               Menstrual History:     No LMP recorded. Patient has had an implant.         Current contraception: IUD mirena 2022   History of abnormal Pap smear: no  Received Gardasil immunization: yes  Perform regular self breast exam : yes  Family history of uterine or ovarian cancer: no  Family History of colon cancer: no  Family history of breast cancer:  MGM 53     Mammogram: ordered.  Colonoscopy: not indicated.  DEXA: not indicated.    Exercise: exercises 3 times a week  weight and cardio 1 hour   Calcium/Vitamin D: adequate intake    The following portions of the patient's history were reviewed and  "updated as appropriate: allergies, current medications, past family history, past medical history, past social history, past surgical history, and problem list.    Review of Systems        Objective   Physical Exam    /78   Ht 157.5 cm (62\")   Wt 88.5 kg (195 lb)   BMI 35.67 kg/m²     General:   alert, appears stated age and cooperative   Neck: thyroid normal to palpation   Heart: regular rate and rhythm   Lungs: clear to auscultation bilaterally   Abdomen: soft, non-tender, without masses or organomegaly   Breast: inspection negative, no nipple discharge or bleeding, no masses or nodularity palpable   Vulva: normal, Bartholin's, Urethra, [place]'s normal   Vagina: normal mucosa, normal discharge   Cervix: no cervical motion tenderness and no lesions   Uterus: non-tender, normal shape and consistency   Adnexa: no mass, fullness, tenderness   Rectal: not indicated     Assessment & Plan   Diagnoses and all orders for this visit:    1. Screening for blood or protein in urine (Primary)  -     POC Urinalysis Dipstick, Multipro    2. Encounter for screening mammogram for malignant neoplasm of breast  -     Mammo screening digital tomosynthesis bilateral w CAD; Future    3. Well female exam with routine gynecological exam  -     IGP, Apt HPV,rfx 16 / 18,45    4. Screening for human papillomavirus (HPV)  -     IGP, Apt HPV,rfx 16 / 18,45    5. Screening for malignant neoplasm of cervix  -     IGP, Apt HPV,rfx 16 / 18,45    6. Weight gain  -     Lipid Panel  -     Hemoglobin A1c  -     TSH  -     Follicle Stimulating Hormone    7. Pelvic pain      My risk testing desired-patient is checking on the age of her grandmother but she thinks she may have been under 50.  Alicia's testing will be sent off today    Patient desires testing due to weight gain.  She does have good exercise habits and is adjusting her diet.    Pap smear sent today    Right lower quadrant pain.  Pelvic ultrasound is ordered.  If that looks good she " is also going to talk to her primary care doctor.  She has seen a gastroenterologist in the past.  We discussed that ultrasound will not show endometriosis.  Patient does not desire laparoscopy at this time to look for endometriosis.     Mammogram is ordered    Patient does have hematuria.  She states that she has had hematuria for a while.  We will check a culture.  Would recommend follow-up with primary care also.    All questions answered.  Breast self exam technique reviewed and patient encouraged to perform self-exam monthly.  Discussed healthy lifestyle modifications.  Recommended 30 minutes of aerobic exercise five times per week.  Discussed calcium needs to prevent osteoporosis.

## 2024-11-02 LAB
CHOLEST SERPL-MCNC: 158 MG/DL (ref 0–200)
FSH SERPL-ACNC: 8.1 MIU/ML
HBA1C MFR BLD: 5.1 % (ref 4.8–5.6)
HDLC SERPL-MCNC: 56 MG/DL (ref 40–60)
LDLC SERPL CALC-MCNC: 90 MG/DL (ref 0–100)
TRIGL SERPL-MCNC: 61 MG/DL (ref 0–150)
TSH SERPL DL<=0.005 MIU/L-ACNC: 2.89 UIU/ML (ref 0.27–4.2)
VLDLC SERPL CALC-MCNC: 12 MG/DL (ref 5–40)

## 2024-11-05 LAB
BACTERIA UR CULT: ABNORMAL
BACTERIA UR CULT: ABNORMAL
CYTOLOGIST CVX/VAG CYTO: NORMAL
CYTOLOGY CVX/VAG DOC CYTO: NORMAL
CYTOLOGY CVX/VAG DOC THIN PREP: NORMAL
DX ICD CODE: NORMAL
HPV I/H RISK 4 DNA CVX QL PROBE+SIG AMP: NEGATIVE
Lab: NORMAL
OTHER STN SPEC: NORMAL
STAT OF ADQ CVX/VAG CYTO-IMP: NORMAL

## 2024-11-05 RX ORDER — CEPHALEXIN 500 MG/1
500 CAPSULE ORAL 3 TIMES DAILY
Qty: 21 CAPSULE | Refills: 0 | Status: SHIPPED | OUTPATIENT
Start: 2024-11-05 | End: 2024-11-12

## 2024-11-18 PROBLEM — Z80.3 FAMILY HISTORY OF BREAST CANCER: Status: ACTIVE | Noted: 2024-11-18

## 2024-12-06 ENCOUNTER — OFFICE VISIT (OUTPATIENT)
Dept: OBSTETRICS AND GYNECOLOGY | Age: 34
End: 2024-12-06
Payer: COMMERCIAL

## 2024-12-06 VITALS
WEIGHT: 194 LBS | DIASTOLIC BLOOD PRESSURE: 84 MMHG | SYSTOLIC BLOOD PRESSURE: 130 MMHG | HEIGHT: 62 IN | BODY MASS INDEX: 35.7 KG/M2

## 2024-12-06 DIAGNOSIS — Z80.3 FAMILY HISTORY OF BREAST CANCER: Primary | ICD-10-CM

## 2024-12-06 DIAGNOSIS — R10.2 PELVIC PAIN: ICD-10-CM

## 2024-12-06 PROCEDURE — 99214 OFFICE O/P EST MOD 30 MIN: CPT | Performed by: OBSTETRICS & GYNECOLOGY

## 2024-12-06 RX ORDER — ESCITALOPRAM OXALATE 20 MG/1
20 TABLET ORAL DAILY
COMMUNITY
Start: 2024-01-19 | End: 2025-01-13

## 2024-12-06 NOTE — PROGRESS NOTES
"  Chief complaint-pelvic pain    History of present illness- Patient is a 34 y.o.  who is here for follow-up of pelvic pain.  She reports that she has lower abdominal and pelvic pain daily.  Patient is planning on seeing urology due to some blood in her urine.  She is concerned about endometriosis and interstitial cystitis.  She does have a Mirena in place which has controlled the bleeding but she still having the pain.  She wanted to discuss possible laparoscopy and she does have an ultrasound today.  She does have a history of some ovarian cyst.  Complains of  loer pelvic pain daily     Patient's genetic testing came back normal.  We do not have the results scanned in yet but we are going to get that scanned into epic.  Her lifetime risk was elevated at 21% so she would be a candidate to alternate MRI and mammogram at age 39.  She would like to do a baseline mammogram now.      /84   Ht 157.5 cm (62\")   Wt 88 kg (194 lb)   BMI 35.48 kg/m²   Physical Exam  Constitutional:       Appearance: Normal appearance.   Pulmonary:      Effort: Pulmonary effort is normal.   Neurological:      Mental Status: She is alert.   Psychiatric:         Mood and Affect: Mood normal.         Thought Content: Thought content normal.         Judgment: Judgment normal.         Pelvic ultrasound shows 9 cm anteverted uterus with no fibroids.  IUD is in good position.  Right ovary is enlarged at 5 cm with 2 cyst.  They appear simple and consistent with previous size on previous ultrasound.  There is also a 3 cm cyst on the left ovary.  This was not seen previously.  No free fluid in the pelvis.    Diagnoses and all orders for this visit:    1. Family history of breast cancer (Primary)  -     Mammo screening digital tomosynthesis bilateral w CAD; Future    2. Pelvic pain    We had a long discussion about pelvic pain.  Discussed that endometriosis would be a possibility.  Patient is interested in laparoscopy to look for " endometriosis and since a lot of her pain is on the right side she would like to have a cystectomy of the persistent right ovarian cyst.  She wants to think this over over the weekend and she will call me next week to see if she wants to set up surgery.  Alternatively she could just see urology or consider a treatment for endometriosis without having surgery like myfembree.    Mammogram is ordered today.  This will be at baseline exam for the patient.  Her lifetime risk is elevated by [place] to 21%.  The [facility] genetic test gives a risk score of 51%.  Her grandmother had breast cancer in her early 50s.  I would recommend alternating mammogram and MRI starting at age 40.  Patient voices understanding.

## (undated) DEVICE — ANTIBACTERIAL UNDYED BRAIDED (POLYGLACTIN 910), SYNTHETIC ABSORBABLE SUTURE: Brand: COATED VICRYL

## (undated) DEVICE — SOL IRR H2O BTL 1000ML STRL

## (undated) DEVICE — SUT MNCRYL PLS ANTIB UD 4/0 PS2 18IN

## (undated) DEVICE — ADHS SKIN DERMABOND TOPICAL HI VISC

## (undated) DEVICE — GLV SURG TRIUMPH CLASSIC PF LTX 6 STRL

## (undated) DEVICE — 3M(TM) TEGADERM(TM) TRANSPARENT FILM DRESSING FRAME STYLE 1627: Brand: 3M™ TEGADERM™

## (undated) DEVICE — SUT VIC PLS 0 CTX 36IN UD VCP978H